# Patient Record
Sex: FEMALE | Race: WHITE | Employment: UNEMPLOYED | URBAN - METROPOLITAN AREA
[De-identification: names, ages, dates, MRNs, and addresses within clinical notes are randomized per-mention and may not be internally consistent; named-entity substitution may affect disease eponyms.]

---

## 2021-03-19 ENCOUNTER — OFFICE VISIT (OUTPATIENT)
Dept: OBGYN CLINIC | Age: 56
End: 2021-03-19
Payer: COMMERCIAL

## 2021-03-19 VITALS — DIASTOLIC BLOOD PRESSURE: 78 MMHG | HEART RATE: 99 BPM | SYSTOLIC BLOOD PRESSURE: 132 MMHG | WEIGHT: 152 LBS

## 2021-03-19 DIAGNOSIS — R10.2 PELVIC PAIN IN FEMALE: ICD-10-CM

## 2021-03-19 DIAGNOSIS — B37.31 VULVOVAGINITIS DUE TO YEAST: ICD-10-CM

## 2021-03-19 DIAGNOSIS — B37.31 VULVOVAGINITIS DUE TO YEAST: Primary | ICD-10-CM

## 2021-03-19 PROCEDURE — 99204 OFFICE O/P NEW MOD 45 MIN: CPT | Performed by: OBSTETRICS & GYNECOLOGY

## 2021-03-19 RX ORDER — TRAZODONE HYDROCHLORIDE 50 MG/1
TABLET ORAL
COMMUNITY
Start: 2021-02-08 | End: 2021-05-13 | Stop reason: SDUPTHER

## 2021-03-19 RX ORDER — FLUCONAZOLE 150 MG/1
150 TABLET ORAL DAILY
Qty: 30 TABLET | Refills: 0 | Status: SHIPPED | OUTPATIENT
Start: 2021-03-19 | End: 2022-03-23 | Stop reason: SDUPTHER

## 2021-03-19 RX ORDER — CLOTRIMAZOLE AND BETAMETHASONE DIPROPIONATE 10; .64 MG/G; MG/G
CREAM TOPICAL
Qty: 1 TUBE | Refills: 6 | Status: SHIPPED | OUTPATIENT
Start: 2021-03-19

## 2021-03-19 RX ORDER — CITALOPRAM 20 MG/1
TABLET ORAL
COMMUNITY
Start: 2021-03-14 | End: 2021-07-19 | Stop reason: SDUPTHER

## 2021-03-19 RX ORDER — ESTRADIOL 0.1 MG/G
CREAM VAGINAL
COMMUNITY
Start: 2021-02-17

## 2021-03-19 RX ORDER — EMPAGLIFLOZIN 25 MG/1
25 TABLET, FILM COATED ORAL DAILY
COMMUNITY
Start: 2020-05-25 | End: 2021-07-19 | Stop reason: ALTCHOICE

## 2021-03-19 ASSESSMENT — ENCOUNTER SYMPTOMS
ABDOMINAL DISTENTION: 0
VOMITING: 0
NAUSEA: 0
COLOR CHANGE: 0
SORE THROAT: 0
BACK PAIN: 0
CONSTIPATION: 0
WHEEZING: 0
COUGH: 0
SHORTNESS OF BREATH: 0
ABDOMINAL PAIN: 0
BLOOD IN STOOL: 0
CHEST TIGHTNESS: 0
VOICE CHANGE: 0
TROUBLE SWALLOWING: 0

## 2021-03-19 NOTE — PROGRESS NOTES
arthralgias, back pain, joint swelling and myalgias. Skin: Negative for color change and rash. Allergic/Immunologic: Negative for environmental allergies, food allergies and immunocompromised state. Neurological: Negative for dizziness, seizures, syncope, speech difficulty, weakness, numbness and headaches. Hematological: Negative for adenopathy. Does not bruise/bleed easily. Psychiatric/Behavioral: Negative for agitation, behavioral problems, confusion, decreased concentration, dysphoric mood and suicidal ideas. The patient is not nervous/anxious and is not hyperactive. Physical Exam  Constitutional:       Appearance: She is well-developed. HENT:      Head: Normocephalic and atraumatic. Eyes:      Pupils: Pupils are equal, round, and reactive to light. Neck:      Musculoskeletal: Normal range of motion. Thyroid: No thyromegaly. Trachea: No tracheal deviation. Cardiovascular:      Rate and Rhythm: Normal rate and regular rhythm. Heart sounds: Normal heart sounds. Pulmonary:      Effort: Pulmonary effort is normal.      Breath sounds: Normal breath sounds. Chest:      Chest wall: No tenderness. Abdominal:      General: Bowel sounds are normal. There is no distension. Palpations: Abdomen is soft. There is no mass. Tenderness: There is no abdominal tenderness. There is no guarding or rebound. Hernia: There is no hernia in the left inguinal area. Genitourinary:     Labia:         Right: No rash, tenderness, lesion or injury. Left: No rash, tenderness, lesion or injury. Vagina: Normal. No foreign body. No vaginal discharge, erythema, tenderness or bleeding. Adnexa:         Right: No mass, tenderness or fullness. Left: No mass, tenderness or fullness. Rectum: Normal. No mass, tenderness, anal fissure, external hemorrhoid or internal hemorrhoid. Normal anal tone. Musculoskeletal: Normal range of motion. Lymphadenopathy:      Cervical: No cervical adenopathy. Neurological:      Mental Status: She is alert and oriented to person, place, and time. Vitals:    03/19/21 0913   BP: 132/78   Pulse: 99   Weight: 152 lb (68.9 kg)       ASSESSMENT/PLAN:  Yeast vulvovaginitis  Pelvic pain  Plan patient to follow-up here in 2 weeks after she has been on low Lotrisone twice a day we did a yeast culture to determine the exact type of yeast that she has. And will discuss further possible treatments for the pelvic pain      No follow-ups on file. On this date 3/19/2021 I have spent 45 minutes reviewing previous notes, test results and face to face with the patient discussing the diagnosis and importance of compliance with the treatment plan as well as documenting on the day of the visit. An electronic signature was used to authenticate this note.

## 2021-03-22 DIAGNOSIS — R10.2 PELVIC PAIN IN FEMALE: ICD-10-CM

## 2021-03-22 LAB
BILIRUBIN URINE: NEGATIVE
BLOOD, URINE: NEGATIVE
CLARITY: CLEAR
COLOR: YELLOW
GLUCOSE URINE: >=1000 MG/DL
KETONES, URINE: ABNORMAL MG/DL
LEUKOCYTE ESTERASE, URINE: NEGATIVE
NITRITE, URINE: NEGATIVE
PH UA: 6 (ref 5–9)
PROTEIN UA: NEGATIVE MG/DL
SPECIFIC GRAVITY UA: 1.04 (ref 1–1.03)
UROBILINOGEN, URINE: 1 E.U./DL

## 2021-03-24 LAB
ORGANISM: ABNORMAL
URINE CULTURE, ROUTINE: ABNORMAL

## 2021-04-07 PROBLEM — E66.9 DIABETES MELLITUS TYPE 2 IN OBESE (HCC): Status: ACTIVE | Noted: 2018-08-01

## 2021-04-07 PROBLEM — E11.69 DIABETES MELLITUS TYPE 2 IN OBESE (HCC): Status: ACTIVE | Noted: 2018-08-01

## 2021-04-07 PROBLEM — N39.41 URGE INCONTINENCE: Status: ACTIVE | Noted: 2021-04-07

## 2021-04-07 PROBLEM — G89.18 PAIN AT SURGICAL SITE: Status: ACTIVE | Noted: 2020-06-27

## 2021-04-07 PROBLEM — Z79.890 POST-MENOPAUSE ON HRT (HORMONE REPLACEMENT THERAPY): Status: ACTIVE | Noted: 2021-04-07

## 2021-04-07 PROBLEM — Z83.3 FAMILY HISTORY OF DIABETES MELLITUS (DM): Status: ACTIVE | Noted: 2021-04-07

## 2021-04-07 PROBLEM — T85.9XXA COMPLICATION OF IMPLANTED VAGINAL MESH: Status: ACTIVE | Noted: 2020-06-27

## 2021-04-07 PROBLEM — E83.52 HYPERCALCEMIA: Status: ACTIVE | Noted: 2018-08-01

## 2021-04-12 RX ORDER — GABAPENTIN 100 MG/1
CAPSULE ORAL
COMMUNITY
Start: 2021-04-02 | End: 2021-05-13 | Stop reason: SDUPTHER

## 2021-04-13 ENCOUNTER — OFFICE VISIT (OUTPATIENT)
Dept: OBGYN CLINIC | Age: 56
End: 2021-04-13
Payer: COMMERCIAL

## 2021-04-13 VITALS — HEART RATE: 102 BPM | SYSTOLIC BLOOD PRESSURE: 122 MMHG | WEIGHT: 152 LBS | DIASTOLIC BLOOD PRESSURE: 62 MMHG

## 2021-04-13 DIAGNOSIS — B37.31 VULVOVAGINITIS DUE TO YEAST: Primary | ICD-10-CM

## 2021-04-13 DIAGNOSIS — R10.2 PELVIC PAIN IN FEMALE: ICD-10-CM

## 2021-04-13 PROCEDURE — 99213 OFFICE O/P EST LOW 20 MIN: CPT | Performed by: OBSTETRICS & GYNECOLOGY

## 2021-04-13 RX ORDER — FLUCONAZOLE 150 MG/1
150 TABLET ORAL ONCE
Qty: 4 TABLET | Refills: 3 | Status: SHIPPED | OUTPATIENT
Start: 2021-04-13 | End: 2021-10-21 | Stop reason: SDUPTHER

## 2021-04-13 RX ORDER — CONJUGATED ESTROGENS 0.62 MG/G
0.5 CREAM VAGINAL DAILY
Qty: 1 TUBE | Refills: 3 | Status: SHIPPED | OUTPATIENT
Start: 2021-04-13 | End: 2022-05-13

## 2021-04-13 ASSESSMENT — ENCOUNTER SYMPTOMS
NAUSEA: 0
VOICE CHANGE: 0
CONSTIPATION: 0
CHEST TIGHTNESS: 0
BACK PAIN: 0
ABDOMINAL PAIN: 0
SHORTNESS OF BREATH: 0
TROUBLE SWALLOWING: 0
COLOR CHANGE: 0
BLOOD IN STOOL: 0
VOMITING: 0
COUGH: 0
WHEEZING: 0
SORE THROAT: 0
ABDOMINAL DISTENTION: 0

## 2021-04-13 NOTE — PROGRESS NOTES
patient is not nervous/anxious and is not hyperactive. Physical Exam  Vitals signs reviewed. Exam conducted with a chaperone present. Genitourinary:                  Vitals:    04/13/21 1308   BP: 122/62   Pulse: 102   Weight: 152 lb (68.9 kg)       ASSESSMENT/PLAN:   Diagnosis Orders   1. Vulvovaginitis due to yeast     2. Pelvic pain in female     Premarin vaginal cream nightly for a month  Premarin 0.625 daily  Continue the Diflucan 50 mg weekly. Patient to follow-up in 1 month      No follow-ups on file. On this date 4/13/2021 I have spent 30 minutes reviewing previous notes, test results and face to face with the patient discussing the diagnosis and importance of compliance with the treatment plan as well as documenting on the day of the visit. An electronic signature was used to authenticate this note.

## 2021-04-16 ENCOUNTER — PATIENT MESSAGE (OUTPATIENT)
Dept: OBGYN CLINIC | Age: 56
End: 2021-04-16

## 2021-04-19 LAB
FINAL REPORT: NORMAL
PRELIMINARY: NORMAL

## 2021-04-21 RX ORDER — ESTRADIOL 1 MG/1
1 TABLET ORAL DAILY
Qty: 30 TABLET | Refills: 3 | Status: SHIPPED | OUTPATIENT
Start: 2021-04-21 | End: 2021-06-21 | Stop reason: ALTCHOICE

## 2021-04-21 NOTE — TELEPHONE ENCOUNTER
Good Morning Aretha, I spoke with Dr. Víctor Moreno regarding the oral Premarin not being covered by your insurance and he suggested switching to a medication that is covered. Estradiol was sent in it's place. There are 3 refills. I looked over your chart and your changes to your emergency contact have been updated.

## 2021-05-13 ENCOUNTER — OFFICE VISIT (OUTPATIENT)
Dept: OBGYN CLINIC | Age: 56
End: 2021-05-13
Payer: COMMERCIAL

## 2021-05-13 VITALS — DIASTOLIC BLOOD PRESSURE: 96 MMHG | WEIGHT: 152 LBS | HEART RATE: 91 BPM | SYSTOLIC BLOOD PRESSURE: 134 MMHG

## 2021-05-13 DIAGNOSIS — Z12.31 ENCOUNTER FOR SCREENING MAMMOGRAM FOR BREAST CANCER: Primary | ICD-10-CM

## 2021-05-13 PROCEDURE — 99213 OFFICE O/P EST LOW 20 MIN: CPT | Performed by: OBSTETRICS & GYNECOLOGY

## 2021-05-13 RX ORDER — GABAPENTIN 100 MG/1
CAPSULE ORAL
Qty: 90 CAPSULE | Refills: 0 | Status: SHIPPED | OUTPATIENT
Start: 2021-05-13 | End: 2021-06-21 | Stop reason: ALTCHOICE

## 2021-05-13 RX ORDER — TRAZODONE HYDROCHLORIDE 50 MG/1
TABLET ORAL
Qty: 60 TABLET | Refills: 0 | Status: SHIPPED | OUTPATIENT
Start: 2021-05-13 | End: 2021-06-25 | Stop reason: SDUPTHER

## 2021-05-13 ASSESSMENT — ENCOUNTER SYMPTOMS
SORE THROAT: 0
VOICE CHANGE: 0
COLOR CHANGE: 0
NAUSEA: 0
ABDOMINAL PAIN: 0
SHORTNESS OF BREATH: 0
WHEEZING: 0
CHEST TIGHTNESS: 0
BACK PAIN: 0
ABDOMINAL DISTENTION: 0
COUGH: 0
TROUBLE SWALLOWING: 0
CONSTIPATION: 0
BLOOD IN STOOL: 0
VOMITING: 0

## 2021-05-13 NOTE — PROGRESS NOTES
Nancy Turpin (: 1965) is a 50 y.o. female, Established patient, here for evaluation of the following chief complaint(s):  Follow-up  Ryne Bolanos is here for a follow-up visit. She is presently using half a gram of Premarin vaginal cream nightly and has done so for a month. She is also taking taking Premarin orally is been using Diflucan once a week for a chronic yeast infection. Those symptoms are gone. The hot flashes are markedly reduced as well. Unfortunately patient is under a great deal of stress that she is going through a divorce. SUBJECTIVE/OBJECTIVE:  HPI    Past Surgical History:   Procedure Laterality Date    BLADDER SUSPENSION      HYSTERECTOMY      LEEP          Review of Systems   Constitutional: Negative for activity change, appetite change, fatigue and unexpected weight change. HENT: Negative for dental problem, ear pain, hearing loss, nosebleeds, sore throat, trouble swallowing and voice change. Eyes: Negative for visual disturbance. Respiratory: Negative for cough, chest tightness, shortness of breath and wheezing. Cardiovascular: Negative for chest pain and palpitations. Gastrointestinal: Negative for abdominal distention, abdominal pain, blood in stool, constipation, nausea and vomiting. Endocrine: Negative for cold intolerance, heat intolerance, polydipsia, polyphagia and polyuria. Genitourinary: Negative for difficulty urinating, dyspareunia, dysuria, flank pain, frequency, genital sores, hematuria, menstrual problem, pelvic pain, urgency, vaginal bleeding, vaginal discharge and vaginal pain. Musculoskeletal: Negative for arthralgias, back pain, joint swelling and myalgias. Skin: Negative for color change and rash. Allergic/Immunologic: Negative for environmental allergies, food allergies and immunocompromised state. Neurological: Negative for dizziness, seizures, syncope, speech difficulty, weakness, numbness and headaches.    Hematological: Negative for adenopathy. Does not bruise/bleed easily. Psychiatric/Behavioral: Negative for agitation, behavioral problems, confusion, decreased concentration, dysphoric mood and suicidal ideas. The patient is not nervous/anxious and is not hyperactive. Physical Exam  Constitutional:       Appearance: She is well-developed. HENT:      Head: Normocephalic and atraumatic. Eyes:      Pupils: Pupils are equal, round, and reactive to light. Neck:      Musculoskeletal: Normal range of motion. Thyroid: No thyromegaly. Trachea: No tracheal deviation. Cardiovascular:      Rate and Rhythm: Normal rate and regular rhythm. Heart sounds: Normal heart sounds. Pulmonary:      Effort: Pulmonary effort is normal.      Breath sounds: Normal breath sounds. Chest:      Chest wall: No tenderness. Abdominal:      General: Bowel sounds are normal. There is no distension. Palpations: Abdomen is soft. There is no mass. Tenderness: There is no abdominal tenderness. There is no guarding or rebound. Hernia: There is no hernia in the left inguinal area. Genitourinary:     Labia:         Right: No rash, tenderness, lesion or injury. Left: No rash, tenderness, lesion or injury. Vagina: Normal. No foreign body. No vaginal discharge, erythema, tenderness or bleeding. Adnexa:         Right: No mass, tenderness or fullness. Left: No mass, tenderness or fullness. Rectum: Normal. No mass, tenderness, anal fissure, external hemorrhoid or internal hemorrhoid. Normal anal tone. Musculoskeletal: Normal range of motion. Lymphadenopathy:      Cervical: No cervical adenopathy. Neurological:      Mental Status: She is alert and oriented to person, place, and time. Vitals:    05/13/21 0933 05/13/21 0939   BP: (!) 148/96 (!) 134/96   Pulse: 91    Weight: 152 lb (68.9 kg)        ASSESSMENT/PLAN:   Diagnosis Orders   1.  Encounter for screening mammogram for breast cancer  Ambulatory referral to [facility]    MARCELINO DIGITAL SCREEN W OR WO CAD BILATERAL   Patient to continue using the oral estrogen. She is going to use the Premarin cream twice a week now for the next 3 months and try to stop it at that point. She is also going to stop taking the Diflucan. She knows that she can restart both if there are recurrent issues. She will follow up here in 1 year  Mammogram ordered    No follow-ups on file. On this date 5/13/2021 I have spent 30 minutes reviewing previous notes, test results and face to face with the patient discussing the diagnosis and importance of compliance with the treatment plan as well as documenting on the day of the visit. An electronic signature was used to authenticate this note.

## 2021-06-21 ENCOUNTER — OFFICE VISIT (OUTPATIENT)
Dept: FAMILY MEDICINE CLINIC | Age: 56
End: 2021-06-21
Payer: COMMERCIAL

## 2021-06-21 VITALS
WEIGHT: 157.2 LBS | HEIGHT: 66 IN | SYSTOLIC BLOOD PRESSURE: 122 MMHG | DIASTOLIC BLOOD PRESSURE: 87 MMHG | BODY MASS INDEX: 25.26 KG/M2 | HEART RATE: 91 BPM | OXYGEN SATURATION: 99 % | TEMPERATURE: 97.5 F

## 2021-06-21 DIAGNOSIS — E66.9 DIABETES MELLITUS TYPE 2 IN OBESE (HCC): ICD-10-CM

## 2021-06-21 DIAGNOSIS — M62.830 BACK SPASM: ICD-10-CM

## 2021-06-21 DIAGNOSIS — Z00.00 PREVENTATIVE HEALTH CARE: ICD-10-CM

## 2021-06-21 DIAGNOSIS — E66.9 DIABETES MELLITUS TYPE 2 IN OBESE (HCC): Primary | ICD-10-CM

## 2021-06-21 DIAGNOSIS — R10.2 PELVIC PAIN: ICD-10-CM

## 2021-06-21 DIAGNOSIS — M54.50 ACUTE BILATERAL LOW BACK PAIN, UNSPECIFIED WHETHER SCIATICA PRESENT: ICD-10-CM

## 2021-06-21 DIAGNOSIS — E11.69 DIABETES MELLITUS TYPE 2 IN OBESE (HCC): ICD-10-CM

## 2021-06-21 DIAGNOSIS — Z12.11 COLON CANCER SCREENING: ICD-10-CM

## 2021-06-21 DIAGNOSIS — E11.69 DIABETES MELLITUS TYPE 2 IN OBESE (HCC): Primary | ICD-10-CM

## 2021-06-21 DIAGNOSIS — M25.559 HIP PAIN: ICD-10-CM

## 2021-06-21 LAB
CREATININE URINE: 71.7 MG/DL
HBA1C MFR BLD: 10.2 %
MICROALBUMIN UR-MCNC: <1.2 MG/DL
MICROALBUMIN/CREAT UR-RTO: NORMAL MG/G (ref 0–30)

## 2021-06-21 PROCEDURE — 99214 OFFICE O/P EST MOD 30 MIN: CPT | Performed by: NURSE PRACTITIONER

## 2021-06-21 PROCEDURE — 83036 HEMOGLOBIN GLYCOSYLATED A1C: CPT | Performed by: NURSE PRACTITIONER

## 2021-06-21 RX ORDER — PREGABALIN 75 MG/1
75 CAPSULE ORAL 3 TIMES DAILY
Qty: 90 CAPSULE | Refills: 0 | Status: SHIPPED | OUTPATIENT
Start: 2021-06-21 | End: 2021-07-01 | Stop reason: SINTOL

## 2021-06-21 RX ORDER — BACLOFEN 10 MG/1
10 TABLET ORAL 2 TIMES DAILY PRN
Qty: 30 TABLET | Refills: 0 | Status: SHIPPED | OUTPATIENT
Start: 2021-06-21 | End: 2021-07-01 | Stop reason: SINTOL

## 2021-06-21 SDOH — ECONOMIC STABILITY: FOOD INSECURITY: WITHIN THE PAST 12 MONTHS, THE FOOD YOU BOUGHT JUST DIDN'T LAST AND YOU DIDN'T HAVE MONEY TO GET MORE.: NEVER TRUE

## 2021-06-21 SDOH — ECONOMIC STABILITY: FOOD INSECURITY: WITHIN THE PAST 12 MONTHS, YOU WORRIED THAT YOUR FOOD WOULD RUN OUT BEFORE YOU GOT MONEY TO BUY MORE.: NEVER TRUE

## 2021-06-21 ASSESSMENT — PATIENT HEALTH QUESTIONNAIRE - PHQ9
1. LITTLE INTEREST OR PLEASURE IN DOING THINGS: 1
SUM OF ALL RESPONSES TO PHQ9 QUESTIONS 1 & 2: 1
SUM OF ALL RESPONSES TO PHQ QUESTIONS 1-9: 1
2. FEELING DOWN, DEPRESSED OR HOPELESS: 0

## 2021-06-21 ASSESSMENT — SOCIAL DETERMINANTS OF HEALTH (SDOH): HOW HARD IS IT FOR YOU TO PAY FOR THE VERY BASICS LIKE FOOD, HOUSING, MEDICAL CARE, AND HEATING?: NOT HARD AT ALL

## 2021-06-21 NOTE — PROGRESS NOTES
Subjective:      Patient ID: Barbi Kiran is a 49 y.o. female who presents today for:     Chief Complaint   Patient presents with   [address]     Patient presents today to establish care. HPI Pt in today to establish care. She reports hx of bladder lift years ago which has given her significant pain since then. She reports that the pain would be in lower abdomen, legs, hips, etc. She reports chronic UTI and yeast infections. She reports that she then started to have a prolapse of the mesh. She reports then she was getting ulcers in vagina and bleeding. She reports she saw a doctor and had all the mesh removed. This did help the pain in her legs. She reports that since going back to work her bilateral hips and right groin have been hurting due to all the walking. She reports that she was started on gabapentin which helps with the pain at home, but unfortunately she cannot take it at work because it makes her too drowsy. She reports that she hasn't been taking metformin or jardiance. She does have it at home. She reports she was trying to limit medications because she's already on so much, but she can start taking it again. She reports that it makes her feel sick when she takes it all so that was part of why she stopped them.      Past Medical History:   Diagnosis Date    Breast disorder     LEFT LUMP 1990 BENIGN    Depression     Diabetes mellitus ([facility] [address]     Menopausal symptoms     Overactive bladder     Stress incontinence      Past Surgical History:   Procedure Laterality Date    BLADDER SUSPENSION      HYSTERECTOMY      LEEP       Family History   Problem Relation Age of Onset    Breast Cancer Mother      Social History     Socioeconomic History    Marital status:      Spouse name: Not on file    Number of children: Not on file    Years of education: Not on file    Highest education level: Not on file   Occupational History    Not on file   Tobacco Use    Smoking status: medications on file prior to visit. Allergies:  Nitrates, organic    Review of Systems   Constitutional: Positive for fatigue. Negative for chills and fever. HENT: Negative for congestion, ear pain and sinus pressure. Respiratory: Negative for cough, shortness of breath and wheezing. Cardiovascular: Negative for chest pain and palpitations. Endocrine: Negative for polyuria. Musculoskeletal: Positive for arthralgias, gait problem and myalgias. Neurological: Negative for headaches. Objective:   /87 (Site: Right Upper Arm, Position: Sitting, Cuff Size: Medium Adult)   Pulse 91   Temp 97.5 °F (36.4 °C) (Temporal)   Ht 5' 6\" (1.676 m)   Wt 157 lb 3.2 oz (71.3 kg)   SpO2 99%   BMI 25.37 kg/m²     Physical Exam  Constitutional:       Appearance: She is well-developed. HENT:      Head: Normocephalic. Right Ear: Tympanic membrane, ear canal and external ear normal.      Left Ear: Tympanic membrane, ear canal and external ear normal.      Nose: Nose normal.      Mouth/Throat:      Mouth: Mucous membranes are moist.      Pharynx: Oropharynx is clear. Uvula midline. Eyes:      General:         Right eye: No discharge. Left eye: No discharge. Conjunctiva/sclera: Conjunctivae normal.   Cardiovascular:      Rate and Rhythm: Normal rate and regular rhythm. Heart sounds: Normal heart sounds. Pulmonary:      Effort: Pulmonary effort is normal. No respiratory distress. Breath sounds: Normal breath sounds. Musculoskeletal:      Cervical back: Normal range of motion. Lymphadenopathy:      Cervical: No cervical adenopathy. Skin:     General: Skin is warm and dry. Neurological:      Mental Status: She is alert and oriented to person, place, and time. Psychiatric:         Mood and Affect: Mood normal.         Behavior: Behavior normal.         Assessment:          Diagnosis Orders   1.  Diabetes mellitus type 2 in obese (HCC)  Microalbumin / Creatinine Urine Ratio    POCT glycosylated hemoglobin (Hb A1C)    Comprehensive Metabolic Panel    Lipid Panel   2. Colon cancer screening  Truddie Mcardle, MD, GastroenterologyJanett   3. Preventative health care  CBC Auto Differential    Comprehensive Metabolic Panel    Lipid Panel   4. Hip pain  pregabalin (LYRICA) 75 MG capsule    XR HIP 3-4 VW W PELVIS BILATERAL   5. Pelvic pain  XR HIP 3-4 VW W PELVIS BILATERAL   6. Acute bilateral low back pain, unspecified whether sciatica present  pregabalin (LYRICA) 75 MG capsule    baclofen (LIORESAL) 10 MG tablet   7.  Back spasm  baclofen (LIORESAL) 10 MG tablet       Plan:      Orders Placed This Encounter   Procedures    XR HIP 3-4 VW W PELVIS BILATERAL     Standing Status:   Future     Standing Expiration Date:   6/21/2022     Order Specific Question:   Reason for exam:     Answer:   bilateral hip pain, right groin pain, and tailbone pain    Microalbumin / Creatinine Urine Ratio     Standing Status:   Future     Number of Occurrences:   1     Standing Expiration Date:   6/21/2022    CBC Auto Differential     Standing Status:   Future     Standing Expiration Date:   6/21/2022    Comprehensive Metabolic Panel     Standing Status:   Future     Standing Expiration Date:   6/21/2022    Lipid Panel     Standing Status:   Future     Standing Expiration Date:   6/21/2022     Order Specific Question:   Is Patient Fasting?/# of Hours     Answer:   [address] [place] [address] Carlos Leung MD, Gastroenterology, [place]     Referral Priority:   Routine     Referral Type:   Eval and Treat     Referral Reason:   Specialty Services Required     Referred to Provider:   Erma Marrero MD     Requested Specialty:   Gastroenterology     Number of Visits Requested:   1    POCT glycosylated hemoglobin (Hb A1C)     Results for POC orders placed in visit on 06/21/21   POCT glycosylated hemoglobin (Hb A1C)   Result Value Ref Range    Hemoglobin A1C 10.2 %          Orders Placed This Encounter   Medications    pregabalin (LYRICA) 75 MG capsule     Sig: Take 1 capsule by mouth 3 times daily for 30 days. Dispense:  90 capsule     Refill:  0    baclofen (LIORESAL) 10 MG tablet     Sig: Take 1 tablet by mouth 2 times daily as needed (muscle spasm)     Dispense:  30 tablet     Refill:  0       Return in about 1 month (around 7/21/2021). Stop gabapentin. Start lyrica. Okay for baclofen prn. Start back on Dm2 medication. XR and labs. Keep f/u with GYN as scheduled. Reviewed with the patient: current clinicalstatus, medications, activities and diet. Side effects, adverse effects of the medication prescribedtoday, as well as treatment plan/ rationale and result expectations have been discussedwith the patient who expresses understanding and desires to proceed. Close follow upto evaluate treatment results and for coordination of care. I have reviewedthe patient's medical history in detail and updated the computerized patient record.     Narendra Gupta, FRANTZ - CNP

## 2021-06-21 NOTE — PATIENT INSTRUCTIONS
more stretch, put your other leg flat on the floor while pulling your knee to your chest.    Curl-ups   1. Lie on the floor on your back with your knees bent at a 90-degree angle. Your feet should be flat on the floor, about 12 inches from your buttocks. 2. Cross your arms over your chest. If this bothers your neck, try putting your hands behind your neck (not your head), with your elbows spread apart. 3. Slowly tighten your belly muscles and raise your shoulder blades off the floor. 4. Keep your head in line with your body, and do not press your chin to your chest.  5. Hold this position for 1 or 2 seconds, then slowly lower yourself back down to the floor. 6. Repeat 8 to 12 times. Pelvic tilt exercise   1. Lie on your back with your knees bent. 2. \"Brace\" your stomach. This means to tighten your muscles by pulling in and imagining your belly button moving toward your spine. You should feel like your back is pressing to the floor and your hips and pelvis are rocking back. 3. Hold for about 6 seconds while you breathe smoothly. 4. Repeat 8 to 12 times. Heel dig bridging   1. Lie on your back with both knees bent and your ankles bent so that only your heels are digging into the floor. Your knees should be bent about 90 degrees. 2. Then push your heels into the floor, squeeze your buttocks, and lift your hips off the floor until your shoulders, hips, and knees are all in a straight line. 3. Hold for about 6 seconds as you continue to breathe normally, and then slowly lower your hips back down to the floor and rest for up to 10 seconds. 4. Do 8 to 12 repetitions. Hamstring stretch in doorway   1. Lie on your back in a doorway, with one leg through the open door. 2. Slide your leg up the wall to straighten your knee. You should feel a gentle stretch down the back of your leg. 3. Hold the stretch for at least 15 to 30 seconds. Do not arch your back, point your toes, or bend either knee.  Keep one heel touching the floor and the other heel touching the wall. 4. Repeat with your other leg. 5. Do 2 to 4 times for each leg. Hip flexor stretch   1. Kneel on the floor with one knee bent and one leg behind you. Place your forward knee over your foot. Keep your other knee touching the floor. 2. Slowly push your hips forward until you feel a stretch in the upper thigh of your rear leg. 3. Hold the stretch for at least 15 to 30 seconds. Repeat with your other leg. 4. Do 2 to 4 times on each side. Wall sit   1. Stand with your back 10 to 12 inches away from a wall. 2. Lean into the wall until your back is flat against it. 3. Slowly slide down until your knees are slightly bent, pressing your lower back into the wall. 4. Hold for about 6 seconds, then slide back up the wall. 5. Repeat 8 to 12 times. Follow-up care is a key part of your treatment and safety. Be sure to make and go to all appointments, and call your doctor if you are having problems. It's also a good idea to know your test results and keep a list of the medicines you take. Where can you learn more? Go to https://[email or web address]. org and sign in to your [employer] account. Enter H651 in the [facility] box to learn more about \"Low Back Pain: Exercises. \"     If you do not have an account, please click on the \"Sign Up Now\" link. Current as of: November 16, 2020               Content Version: 12.9  © 2006-2021 [employer]wise, Inc[employer]. Care instructions adapted under license by [facility] ([facility]). If you have questions about a medical condition or this instruction, always ask your healthcare professional. [place] [address] any warranty or liability for your use of this information.

## 2021-06-25 ASSESSMENT — ENCOUNTER SYMPTOMS
SHORTNESS OF BREATH: 0
WHEEZING: 0
COUGH: 0
SINUS PRESSURE: 0

## 2021-07-01 ENCOUNTER — PATIENT MESSAGE (OUTPATIENT)
Dept: FAMILY MEDICINE CLINIC | Age: 56
End: 2021-07-01

## 2021-07-01 RX ORDER — ESTRADIOL 1 MG/1
1 TABLET ORAL DAILY
Qty: 30 TABLET | Refills: 3
Start: 2021-07-01 | End: 2021-09-16

## 2021-07-01 NOTE — TELEPHONE ENCOUNTER
From: David Arriaga  To: Raymond Anoop, APRN - CNP  Sent: 7/1/2021 11:06 AM EDT  Subject: Prescription Question    [facility], I do not like either of these new meds. . Baclofen or Pregabalin. Both make me very dizzy and light-headed. I can't take these meds during the day for nerve pain when I am at work. I am stopping them today. Unless you have a better option, I will go back to taking the Gabapentin. I usually take (2) 100 mg. Also as a correction, I have not stopped taking estradiol or estrogen.      START taking:  baclofen (LIORESAL)   pregabalin (Lyrica)      STOP taking:  estradiol 1 MG tablet (Estrace)   estrogens (conjugated) 0.625 MG tablet (Premarin)   gabapentin 100 MG capsule (NEURONTIN)

## 2021-07-03 DIAGNOSIS — E66.9 DIABETES MELLITUS TYPE 2 IN OBESE (HCC): ICD-10-CM

## 2021-07-03 DIAGNOSIS — E11.69 DIABETES MELLITUS TYPE 2 IN OBESE (HCC): ICD-10-CM

## 2021-07-03 DIAGNOSIS — Z00.00 PREVENTATIVE HEALTH CARE: ICD-10-CM

## 2021-07-03 LAB
ALBUMIN SERPL-MCNC: 4.7 G/DL (ref 3.5–4.6)
ALP BLD-CCNC: 106 U/L (ref 40–130)
ALT SERPL-CCNC: 31 U/L (ref 0–33)
ANION GAP SERPL CALCULATED.3IONS-SCNC: 13 MEQ/L (ref 9–15)
AST SERPL-CCNC: 31 U/L (ref 0–35)
BASOPHILS ABSOLUTE: 0 K/UL (ref 0–0.2)
BASOPHILS RELATIVE PERCENT: 0.3 %
BILIRUB SERPL-MCNC: 0.8 MG/DL (ref 0.2–0.7)
BUN BLDV-MCNC: 12 MG/DL (ref 6–20)
CALCIUM SERPL-MCNC: 9.6 MG/DL (ref 8.5–9.9)
CHLORIDE BLD-SCNC: 97 MEQ/L (ref 95–107)
CHOLESTEROL, TOTAL: 193 MG/DL (ref 0–199)
CO2: 27 MEQ/L (ref 20–31)
CREAT SERPL-MCNC: 0.65 MG/DL (ref 0.5–0.9)
EOSINOPHILS ABSOLUTE: 0.1 K/UL (ref 0–0.7)
EOSINOPHILS RELATIVE PERCENT: 1.3 %
GFR AFRICAN AMERICAN: >60
GFR NON-AFRICAN AMERICAN: >60
GLOBULIN: 3.3 G/DL (ref 2.3–3.5)
GLUCOSE BLD-MCNC: 127 MG/DL (ref 70–99)
HCT VFR BLD CALC: 48 % (ref 37–47)
HDLC SERPL-MCNC: 39 MG/DL (ref 40–59)
HEMOGLOBIN: 16.5 G/DL (ref 12–16)
LDL CHOLESTEROL CALCULATED: 89 MG/DL (ref 0–129)
LYMPHOCYTES ABSOLUTE: 2.9 K/UL (ref 1–4.8)
LYMPHOCYTES RELATIVE PERCENT: 39.9 %
MCH RBC QN AUTO: 30.3 PG (ref 27–31.3)
MCHC RBC AUTO-ENTMCNC: 34.5 % (ref 33–37)
MCV RBC AUTO: 88 FL (ref 82–100)
MONOCYTES ABSOLUTE: 0.5 K/UL (ref 0.2–0.8)
MONOCYTES RELATIVE PERCENT: 6.8 %
NEUTROPHILS ABSOLUTE: 3.8 K/UL (ref 1.4–6.5)
NEUTROPHILS RELATIVE PERCENT: 51.7 %
PDW BLD-RTO: 12.7 % (ref 11.5–14.5)
PLATELET # BLD: 304 K/UL (ref 130–400)
POTASSIUM SERPL-SCNC: 3.8 MEQ/L (ref 3.4–4.9)
RBC # BLD: 5.45 M/UL (ref 4.2–5.4)
SODIUM BLD-SCNC: 137 MEQ/L (ref 135–144)
TOTAL PROTEIN: 8 G/DL (ref 6.3–8)
TRIGL SERPL-MCNC: 325 MG/DL (ref 0–150)
WBC # BLD: 7.3 K/UL (ref 4.8–10.8)

## 2021-07-19 ENCOUNTER — OFFICE VISIT (OUTPATIENT)
Dept: FAMILY MEDICINE CLINIC | Age: 56
End: 2021-07-19
Payer: COMMERCIAL

## 2021-07-19 VITALS
OXYGEN SATURATION: 96 % | HEIGHT: 66 IN | TEMPERATURE: 97.3 F | SYSTOLIC BLOOD PRESSURE: 130 MMHG | DIASTOLIC BLOOD PRESSURE: 82 MMHG | HEART RATE: 83 BPM | WEIGHT: 155.4 LBS | BODY MASS INDEX: 24.98 KG/M2

## 2021-07-19 DIAGNOSIS — K42.9 PERIUMBILICAL HERNIA: ICD-10-CM

## 2021-07-19 DIAGNOSIS — G62.9 NEUROPATHY: ICD-10-CM

## 2021-07-19 DIAGNOSIS — M53.3 COCCYX PAIN: ICD-10-CM

## 2021-07-19 DIAGNOSIS — E11.9 TYPE 2 DIABETES MELLITUS WITHOUT COMPLICATION, WITHOUT LONG-TERM CURRENT USE OF INSULIN (HCC): Primary | ICD-10-CM

## 2021-07-19 DIAGNOSIS — M25.559 HIP PAIN: ICD-10-CM

## 2021-07-19 PROCEDURE — 99214 OFFICE O/P EST MOD 30 MIN: CPT | Performed by: NURSE PRACTITIONER

## 2021-07-19 RX ORDER — GABAPENTIN 100 MG/1
100 CAPSULE ORAL 3 TIMES DAILY
COMMUNITY
End: 2021-07-19 | Stop reason: DRUGHIGH

## 2021-07-19 RX ORDER — GABAPENTIN 300 MG/1
300 CAPSULE ORAL 3 TIMES DAILY
Qty: 90 CAPSULE | Refills: 2 | Status: SHIPPED | OUTPATIENT
Start: 2021-07-19 | End: 2021-09-24 | Stop reason: SDUPTHER

## 2021-07-19 RX ORDER — CANAGLIFLOZIN 100 MG/1
100 TABLET, FILM COATED ORAL
Qty: 90 TABLET | Refills: 1 | Status: SHIPPED | OUTPATIENT
Start: 2021-07-19

## 2021-07-19 RX ORDER — CITALOPRAM 20 MG/1
TABLET ORAL
Qty: 45 TABLET | Refills: 2 | Status: SHIPPED | OUTPATIENT
Start: 2021-07-19 | End: 2021-09-24 | Stop reason: SDUPTHER

## 2021-07-19 NOTE — PATIENT INSTRUCTIONS
Patient Education        Learning About the Glycemic Index  What is the glycemic index? The glycemic index (GI) is a rating system for foods that contain carbohydrate. It helps you know how quickly these foods raise blood sugar. Carbohydrate raises blood sugar more quickly than other nutrients, like proteins and fats. Some carbohydrate foods raise blood sugar faster than others. · Low glycemic foods release sugar into the blood slowly. · High glycemic foods make blood sugar rise quickly. How does it work? Foods in the index are ranked by number. · High glycemic index foods are rated 70 and above. · Medium glycemic index foods are 56 to 69. · Low glycemic index foods are 55 or less. What do you need to know? Some people who have diabetes use the glycemic index to help them plan meals and manage blood sugar. · If you have diabetes, talk with your doctor, a dietitian, or a certified diabetes educator before using a low glycemic index eating plan. · Eating low glycemic foods is most helpful when used along with another eating plan for diabetes, such as carbohydrate counting. Counting carbs helps you know how much carbohydrate you're eating. The amount of carbohydrate you eat is more important than the glycemic index of foods in helping you control your blood sugar. · The rating of a food can change depending on ripeness, how it is prepared (juiced, mashed, ground), how it is cooked, and how long it is stored. · People respond differently to the glycemic content of foods. Many things affect the glycemic index. The only way to know for sure how a food affects your blood sugar is to check your blood sugar before and after you eat that food. · High GI foods are rarely eaten on their own. This means that the glycemic index might not be helpful unless you're eating a food by itself. Eating foods together can change their rating. What are examples of foods in the glycemic index?   · High glycemic index foods

## 2021-07-19 NOTE — PROGRESS NOTES
Subjective:      Patient ID: Brittaney Moura is a 49 y.o. female who presents today for:     Chief Complaint   Patient presents with    Results     Patient presents today to discuss lab results. HPI Pt continues to have pain in sacrum shooting up through hips and in groin/pelvis. She reports that it has not improved. She is concerned as it has been on going for so long. She reports that it seems to start in the back and shoot through the front. Gabapentin is mildly helpful. She would like to discuss increased dose. She reports she would also like to see about changing Dm2 medication. Insurance is not covering jardiance and it will cost a couple of hundred out of pocket. She has been taking medications regularly again and doing well. TBT mesh was taken from rectum- Noted for colonoscopy. Added to bladder suspension note. Past Medical History:   Diagnosis Date    Breast disorder     LEFT LUMP 1990 BENIGN    Depression     Diabetes mellitus ([facility] [address]     Menopausal symptoms     Overactive bladder     Stress incontinence      Past Surgical History:   Procedure Laterality Date    BLADDER SUSPENSION      TBT Mesh removal- 5/2020 Some in rectum    HYSTERECTOMY      LEEP       Family History   Problem Relation Age of Onset    Breast Cancer Mother      Social History     Socioeconomic History    Marital status:      Spouse name: Not on file    Number of children: Not on file    Years of education: Not on file    Highest education level: Not on file   Occupational History    Not on file   Tobacco Use    Smoking status: Never Smoker    Smokeless tobacco: Never Used   Substance and Sexual Activity    Alcohol use:  Yes    Drug use: Never    Sexual activity: Not on file   Other Topics Concern    Not on file   Social History Narrative    Not on file     Social Determinants of Health     Financial Resource Strain: Low Risk     Difficulty of Paying Living Expenses: Not hard at all   Food Insecurity: No Food Insecurity    Worried About Running Out of Food in the Last Year: Never true    Ran Out of Food in the Last Year: Never true   Transportation Needs:     Lack of Transportation (Medical):  Lack of Transportation (Non-Medical):    Physical Activity:     Days of Exercise per Week:     Minutes of Exercise per Session:    Stress:     Feeling of Stress :    Social Connections:     Frequency of Communication with Friends and Family:     Frequency of Social Gatherings with Friends and Family:     Attends Gnosticist Services:     Active Member of Clubs or Organizations:     Attends Club or Organization Meetings:     Marital Status:    Intimate Partner Violence:     Fear of Current or Ex-Partner:     Emotionally Abused:     Physically Abused:     Sexually Abused:      Current Outpatient Medications on File Prior to Visit   Medication Sig Dispense Refill    estradiol (ESTRACE) 1 MG tablet Take 1 tablet by mouth daily 30 tablet 3    estrogens, conjugated, (PREMARIN) 0.625 MG tablet Take 1 tablet by mouth daily 30 tablet 5    traZODone (DESYREL) 50 MG tablet Take 2 tablets by mouth at bedtime as needed for insomnia. 60 tablet 0    conjugated estrogens (PREMARIN) 0.625 MG/GM vaginal cream Place 0.5 g vaginally daily For 30 days 1 Tube 3    estradiol (ESTRACE) 0.1 MG/GM vaginal cream       metFORMIN (GLUCOPHAGE) 1000 MG tablet       clotrimazole-betamethasone (LOTRISONE) 1-0.05 % cream Apply topically 2 times daily. 1 Tube 6    fluconazole (DIFLUCAN) 150 MG tablet Take 1 tablet by mouth daily 30 tablet 0     No current facility-administered medications on file prior to visit. Allergies:  Nitrates, organic    Review of Systems   Constitutional: Negative for chills, fatigue and fever. HENT: Negative for congestion and ear pain. Respiratory: Negative for cough, shortness of breath and wheezing. Cardiovascular: Negative for chest pain, palpitations and leg swelling. Gastrointestinal: Positive for abdominal pain. Genitourinary: Negative for difficulty urinating. Musculoskeletal: Positive for arthralgias, back pain, gait problem and myalgias. Skin: Negative for color change, rash and wound. Neurological: Negative for dizziness and headaches. Objective:   /82 (Site: Left Upper Arm, Position: Sitting, Cuff Size: Medium Adult)   Pulse 83   Temp 97.3 °F (36.3 °C) (Temporal)   Ht 5' 6\" (1.676 m)   Wt 155 lb 6.4 oz (70.5 kg)   SpO2 96%   Breastfeeding No   BMI 25.08 kg/m²     Physical Exam  Constitutional:       Appearance: She is well-developed. HENT:      Head: Normocephalic. Right Ear: Tympanic membrane, ear canal and external ear normal.      Left Ear: Tympanic membrane, ear canal and external ear normal.      Nose: Nose normal.      Mouth/Throat:      Mouth: Mucous membranes are moist.      Pharynx: Oropharynx is clear. Uvula midline. Eyes:      General:         Right eye: No discharge. Left eye: No discharge. Conjunctiva/sclera: Conjunctivae normal.   Cardiovascular:      Rate and Rhythm: Normal rate and regular rhythm. Heart sounds: Normal heart sounds. Pulmonary:      Effort: Pulmonary effort is normal. No respiratory distress. Breath sounds: Normal breath sounds. Abdominal:      Tenderness: There is generalized abdominal tenderness. Musculoskeletal:      Cervical back: Normal range of motion. Lumbar back: Spasms, tenderness and bony tenderness present. No swelling, edema, deformity, signs of trauma or lacerations. Decreased range of motion. Back:    Lymphadenopathy:      Cervical: No cervical adenopathy. Skin:     General: Skin is warm and dry. Neurological:      Mental Status: She is alert and oriented to person, place, and time. Psychiatric:         Mood and Affect: Mood normal.         Behavior: Behavior normal.         Assessment:          Diagnosis Orders   1.  Type 2 diabetes mellitus without complication, without long-term current use of insulin (HCC)  canagliflozin (INVOKANA) 100 MG TABS tablet   2. Neuropathy  gabapentin (NEURONTIN) 300 MG capsule   3. Coccyx pain  MRI LUMBAR SPINE WO CONTRAST   4. Hip pain  MRI LUMBAR SPINE WO CONTRAST       Plan:      Orders Placed This Encounter   Procedures    MRI LUMBAR SPINE WO CONTRAST     Standing Status:   Future     Standing Expiration Date:   7/19/2022     Order Specific Question:   Reason for exam:     Answer:   coccyx, hip, pelvic pain x years. Orders Placed This Encounter   Medications    gabapentin (NEURONTIN) 300 MG capsule     Sig: Take 1 capsule by mouth 3 times daily for 90 days. Dispense:  90 capsule     Refill:  2    canagliflozin (INVOKANA) 100 MG TABS tablet     Sig: Take 1 tablet by mouth every morning (before breakfast)     Dispense:  90 tablet     Refill:  1    citalopram (CELEXA) 20 MG tablet     Sig: take 1 and 1/2 tablets by mouth once daily. Dispense:  45 tablet     Refill:  2       Return in about 2 months (around 9/19/2021). Start MRI pending results will get CT abdomen pelvis. Dm2- stop jardiance start invokana. Neuropathy- increase gabapentin. Reviewed with the patient: current clinicalstatus, medications, activities and diet. Side effects, adverse effects of the medication prescribedtoday, as well as treatment plan/ rationale and result expectations have been discussedwith the patient who expresses understanding and desires to proceed. Close follow upto evaluate treatment results and for coordination of care. I have reviewedthe patient's medical history in detail and updated the computerized patient record.     Elizabeth Suazo, APRN - CNP

## 2021-07-21 ASSESSMENT — ENCOUNTER SYMPTOMS
COUGH: 0
SHORTNESS OF BREATH: 0
BACK PAIN: 1
COLOR CHANGE: 0
ABDOMINAL PAIN: 1
WHEEZING: 0

## 2021-08-02 ENCOUNTER — HOSPITAL ENCOUNTER (OUTPATIENT)
Dept: WOMENS IMAGING | Age: 56
Discharge: HOME | End: 2021-08-04
Payer: COMMERCIAL

## 2021-08-02 DIAGNOSIS — Z12.31 ENCOUNTER FOR SCREENING MAMMOGRAM FOR BREAST CANCER: ICD-10-CM

## 2021-08-02 PROCEDURE — 77063 BREAST TOMOSYNTHESIS BI: CPT

## 2021-08-09 ENCOUNTER — TELEPHONE (OUTPATIENT)
Dept: OBGYN CLINIC | Age: 56
End: 2021-08-09

## 2021-08-09 DIAGNOSIS — R92.8 ABNORMAL MAMMOGRAM: Primary | ICD-10-CM

## 2021-08-12 ENCOUNTER — HOSPITAL ENCOUNTER (OUTPATIENT)
Dept: ULTRASOUND IMAGING | Age: 56
Discharge: HOME | End: 2021-08-14
Payer: COMMERCIAL

## 2021-08-12 ENCOUNTER — HOSPITAL ENCOUNTER (OUTPATIENT)
Dept: WOMENS IMAGING | Age: 56
Discharge: HOME | End: 2021-08-14
Payer: COMMERCIAL

## 2021-08-12 DIAGNOSIS — R92.8 ABNORMAL MAMMOGRAM: ICD-10-CM

## 2021-08-12 PROCEDURE — 76642 ULTRASOUND BREAST LIMITED: CPT

## 2021-08-12 PROCEDURE — 77065 DX MAMMO INCL CAD UNI: CPT

## 2021-08-18 ENCOUNTER — PATIENT MESSAGE (OUTPATIENT)
Dept: OBGYN CLINIC | Age: 56
End: 2021-08-18

## 2021-08-18 DIAGNOSIS — Z12.31 ENCOUNTER FOR SCREENING MAMMOGRAM FOR BREAST CANCER: Primary | ICD-10-CM

## 2021-08-18 DIAGNOSIS — R92.8 ABNORMAL MAMMOGRAM: ICD-10-CM

## 2021-08-19 NOTE — TELEPHONE ENCOUNTER
From: Randal Donahue  To: Ubaldo Murillo DO  Sent: 8/18/2021 11:16 AM EDT  Subject: Test Results Question    Dr. Yeison Hutchinson,   Please enter an order for me to get a 2D/3D mammogram and ultrasound for February, 2022. My recent mammogram was flagged as abnormal but they had no actual findings. Recheck in 6 months.    Thanks,   Randal Donahue

## 2021-08-23 ENCOUNTER — PATIENT MESSAGE (OUTPATIENT)
Dept: OBGYN CLINIC | Age: 56
End: 2021-08-23

## 2021-08-23 NOTE — TELEPHONE ENCOUNTER
From: Ramya Liu  To: Eliz Cotton DO  Sent: 8/23/2021 7:53 AM EDT  Subject: Test Results Question    Dr. Rosita Cranker,     Can you review and explain to me my mammogram and additional testing results. I'm wondering if I should be getting a \"second opinion\" rather than waiting 6 more months. Please let me know if I need an appointment. I'd appreciate your opinion.      Thanks,  [facility]

## 2021-08-25 ENCOUNTER — TELEPHONE (OUTPATIENT)
Dept: OBGYN CLINIC | Age: 56
End: 2021-08-25

## 2021-08-25 RX ORDER — TRAZODONE HYDROCHLORIDE 50 MG/1
TABLET ORAL
Qty: 60 TABLET | Refills: 0 | Status: SHIPPED | OUTPATIENT
Start: 2021-08-25 | End: 2021-09-24 | Stop reason: SDUPTHER

## 2021-08-25 NOTE — TELEPHONE ENCOUNTER
Dr. Noelle Vang has been giving her short-term refills. I'll give her another short-term refill, but she has a primary care provider. Please suggest that she receive a long-term prescription from her PCP.

## 2021-09-16 RX ORDER — ESTRADIOL 1 MG/1
1 TABLET ORAL DAILY
Qty: 30 TABLET | Refills: 3 | Status: SHIPPED | OUTPATIENT
Start: 2021-09-16 | End: 2021-10-21 | Stop reason: SDUPTHER

## 2021-09-24 DIAGNOSIS — G62.9 NEUROPATHY: ICD-10-CM

## 2021-09-24 RX ORDER — TRAZODONE HYDROCHLORIDE 50 MG/1
TABLET ORAL
Qty: 180 TABLET | Refills: 0 | Status: SHIPPED | OUTPATIENT
Start: 2021-09-24 | End: 2021-12-06

## 2021-09-24 RX ORDER — CITALOPRAM 20 MG/1
TABLET ORAL
Qty: 135 TABLET | Refills: 0 | Status: SHIPPED | OUTPATIENT
Start: 2021-09-24 | End: 2022-01-03

## 2021-09-24 RX ORDER — GABAPENTIN 300 MG/1
300 CAPSULE ORAL 3 TIMES DAILY
Qty: 270 CAPSULE | Refills: 0 | Status: SHIPPED | OUTPATIENT
Start: 2021-09-24 | End: 2021-12-23

## 2021-09-24 RX ORDER — ESTRADIOL 1 MG/1
1 TABLET ORAL DAILY
Qty: 90 TABLET | Refills: 0 | OUTPATIENT
Start: 2021-09-24

## 2021-09-24 NOTE — TELEPHONE ENCOUNTER
Requesting medication refill. Please approve or deny this request.    Rx requested:  Requested Prescriptions     Pending Prescriptions Disp Refills    citalopram (CELEXA) 20 MG tablet 135 tablet 0     Sig: take 1 and 1/2 tablets by mouth once daily.  gabapentin (NEURONTIN) 300 MG capsule 270 capsule 0     Sig: Take 1 capsule by mouth 3 times daily for 90 days.  traZODone (DESYREL) 50 MG tablet 180 tablet 0     Sig: Take 2 tablets by mouth at bedtime as needed for insomnia.     estradiol (ESTRACE) 1 MG tablet 90 tablet 0     Sig: Take 1 tablet by mouth daily       Last Office Visit:   7/19/2021    Last Filled:      Last Labs:      Next Visit Date:  Future Appointments   Date Time Provider Justina Glaser   9/27/2021  4:30 PM FRANTZ Tim CNP [facility]   5/13/2022  8:30 AM Jourdan Cates DO [facility] [address]

## 2021-09-28 RX ORDER — ESTRADIOL 1 MG/1
1 TABLET ORAL DAILY
Qty: 90 TABLET | Refills: 0 | OUTPATIENT
Start: 2021-09-28

## 2021-09-28 NOTE — TELEPHONE ENCOUNTER
Requesting medication refill.  Please approve or deny this request.    Rx requested:  Requested Prescriptions     Pending Prescriptions Disp Refills    estradiol (ESTRACE) 1 MG tablet 90 tablet 0     Sig: Take 1 tablet by mouth daily       Last Office Visit:   7/19/2021    Last Filled:      Last Labs:      Next Visit Date:  Future Appointments   Date Time Provider Justina Glaser   5/13/2022  8:30 AM David Poon, DO [address]

## 2021-10-08 RX ORDER — ESTRADIOL 1 MG/1
1 TABLET ORAL DAILY
Qty: 90 TABLET | Refills: 0 | OUTPATIENT
Start: 2021-10-08

## 2021-10-08 NOTE — TELEPHONE ENCOUNTER
Requesting medication refill.  Please approve or deny this request.    Rx requested:  Requested Prescriptions     Pending Prescriptions Disp Refills    estradiol (ESTRACE) 1 MG tablet 90 tablet 0     Sig: Take 1 tablet by mouth daily       Last Office Visit:   7/19/2021    Last Filled:      Last Labs:      Next Visit Date:  Future Appointments   Date Time Provider Justina Glaser   5/13/2022  8:30 AM Rabia Kent DO [address] [place] [address]

## 2021-10-08 NOTE — TELEPHONE ENCOUNTER
[facility]  Clinical Staff  Subject: Refill Request     QUESTIONS   Name of Medication? estradiol (ESTRACE) 1 MG tablet   Patient-reported dosage and instructions? 1 mg   How many days do you have left? 0   Preferred Pharmacy? [facility] phone number (if available)? [phone]   Additional Information for Provider? [facility] requesting 90 day   supply with 3 refills on medication   ---------------------------------------------------------------------------   --------------   CALL BACK INFO   What is the best way for the office to contact you? OK to leave message on   voicemail   Preferred Call Back Phone Number?  [phone]

## 2021-10-20 ENCOUNTER — PATIENT MESSAGE (OUTPATIENT)
Dept: OBGYN CLINIC | Age: 56
End: 2021-10-20

## 2021-10-20 NOTE — TELEPHONE ENCOUNTER
From: Josephien Valadez  To: Mirna Flores DO  Sent: 10/20/2021 10:08 AM EDT  Subject: Prescription Question    Hello - Can you please renew my scripts for Estradiol 1 mg. and the Fluconazole 150 mg. with refills. [facility] [phone].  Thanks, Rajendra Underwood

## 2021-10-21 RX ORDER — FLUCONAZOLE 150 MG/1
150 TABLET ORAL ONCE
Qty: 4 TABLET | Refills: 3 | Status: SHIPPED | OUTPATIENT
Start: 2021-10-21 | End: 2021-10-21

## 2021-10-21 RX ORDER — ESTRADIOL 1 MG/1
1 TABLET ORAL DAILY
Qty: 30 TABLET | Refills: 3 | Status: SHIPPED | OUTPATIENT
Start: 2021-10-21 | End: 2022-01-26 | Stop reason: SDUPTHER

## 2022-01-21 RX ORDER — ESTRADIOL 1 MG/1
1 TABLET ORAL DAILY
Qty: 90 TABLET | Refills: 0 | OUTPATIENT
Start: 2022-01-21

## 2022-01-21 NOTE — TELEPHONE ENCOUNTER
Requesting medication refill.  Please approve or deny this request.    Rx requested:  Requested Prescriptions     Pending Prescriptions Disp Refills    estradiol (ESTRACE) 1 MG tablet 90 tablet 0     Sig: Take 1 tablet by mouth daily       Last Office Visit:   7/19/2021    Last Filled:      Last Labs:      Next Visit Date:  Future Appointments   Date Time Provider Justina Glaser   5/13/2022  8:30 AM Bashir Johnson DO [address] [place] [address]

## 2022-01-26 ENCOUNTER — PATIENT MESSAGE (OUTPATIENT)
Dept: FAMILY MEDICINE CLINIC | Age: 57
End: 2022-01-26

## 2022-01-26 NOTE — TELEPHONE ENCOUNTER
From: Jomar Silva  To: Margot Sahu  Sent: 1/26/2022 3:07 PM EST  Subject: Glipizide     Hello, Can I get a refill of Glipizide 5mg /2X day from [facility] 90 day supply. Last time was from Dr. Vaibhav Thornton.  Thanks Pocits

## 2022-02-10 ENCOUNTER — HOSPITAL ENCOUNTER (OUTPATIENT)
Dept: ULTRASOUND IMAGING | Age: 57
Discharge: HOME | End: 2022-02-12
Payer: COMMERCIAL

## 2022-02-10 ENCOUNTER — HOSPITAL ENCOUNTER (OUTPATIENT)
Dept: WOMENS IMAGING | Age: 57
Discharge: HOME | End: 2022-02-12
Payer: COMMERCIAL

## 2022-02-10 DIAGNOSIS — Z12.31 ENCOUNTER FOR SCREENING MAMMOGRAM FOR BREAST CANCER: ICD-10-CM

## 2022-02-10 PROCEDURE — G0279 TOMOSYNTHESIS, MAMMO: HCPCS

## 2022-03-07 RX ORDER — TRAZODONE HYDROCHLORIDE 50 MG/1
TABLET ORAL
Qty: 180 TABLET | Refills: 3 | Status: SHIPPED | OUTPATIENT
Start: 2022-03-07

## 2022-03-23 ENCOUNTER — PATIENT MESSAGE (OUTPATIENT)
Dept: OBGYN CLINIC | Age: 57
End: 2022-03-23

## 2022-03-23 RX ORDER — FLUCONAZOLE 150 MG/1
150 TABLET ORAL DAILY
Qty: 30 TABLET | Refills: 3 | Status: SHIPPED | OUTPATIENT
Start: 2022-03-23

## 2022-03-23 NOTE — TELEPHONE ENCOUNTER
From: Esperanza Norris  To: Dr. Gilford Daniel: 3/23/2022 8:37 AM EDT  Subject: Fluconazole 150 mg     Hi Dr. Marsha Brown, I need a refill of the fluconazole with refills, please. I know I have an upcoming appt. with you in May.  Please call in to [facility]  Thanks Malcolm Gaines

## 2022-05-13 ENCOUNTER — OFFICE VISIT (OUTPATIENT)
Dept: OBGYN CLINIC | Age: 57
End: 2022-05-13
Payer: COMMERCIAL

## 2022-05-13 VITALS
DIASTOLIC BLOOD PRESSURE: 72 MMHG | SYSTOLIC BLOOD PRESSURE: 126 MMHG | HEIGHT: 66 IN | BODY MASS INDEX: 24.43 KG/M2 | WEIGHT: 152 LBS

## 2022-05-13 DIAGNOSIS — Z11.51 SCREENING FOR HUMAN PAPILLOMAVIRUS: ICD-10-CM

## 2022-05-13 DIAGNOSIS — Z12.31 SCREENING MAMMOGRAM, ENCOUNTER FOR: ICD-10-CM

## 2022-05-13 DIAGNOSIS — B37.31 VULVOVAGINITIS DUE TO YEAST: ICD-10-CM

## 2022-05-13 DIAGNOSIS — Z01.419 PAP SMEAR, AS PART OF ROUTINE GYNECOLOGICAL EXAMINATION: Primary | ICD-10-CM

## 2022-05-13 DIAGNOSIS — Z01.419 PAP SMEAR, AS PART OF ROUTINE GYNECOLOGICAL EXAMINATION: ICD-10-CM

## 2022-05-13 PROCEDURE — 99396 PREV VISIT EST AGE 40-64: CPT | Performed by: OBSTETRICS & GYNECOLOGY

## 2022-05-13 ASSESSMENT — ENCOUNTER SYMPTOMS
BLOOD IN STOOL: 0
SHORTNESS OF BREATH: 0
BACK PAIN: 0
VOICE CHANGE: 0
ABDOMINAL PAIN: 0
SORE THROAT: 0
VOMITING: 0
WHEEZING: 0
NAUSEA: 0
COUGH: 0
COLOR CHANGE: 0
ABDOMINAL DISTENTION: 0
CONSTIPATION: 0
TROUBLE SWALLOWING: 0
CHEST TIGHTNESS: 0

## 2022-05-13 NOTE — PROGRESS NOTES
CHIEF COMPLAINT: Patient continues to have vaginal discomfort and hip and pelvic pain from the mesh that she had placed in the past that had to be surgically removed. Is using a milligram of Estrace Estrace a day as well as Premarin vaginal cream twice weekly. She states that during intercourse she is doing okay but has discomfort for a couple of hours after  Chief Complaint   Patient presents with    Annual Exam     DONG/BSO last pap was in 2009         HISTORY OF PRESENT ILLNESS:  49 y.o. female presents for her annual exam. She had no concernsor complaints today. Her menses is absent. She denies breakthrough bleeding, pelvic pain, or abnormal discharge. Bowel and bladder function is normal. Her health overallhas been good. Past Medical History:   Diagnosis Date    Breast disorder     LEFT LUMP 1990 BENIGN    Depression     Diabetes mellitus ([facility] [address]     Menopausal symptoms     Overactive bladder     Stress incontinence      Past Surgical History:   Procedure Laterality Date    BLADDER SUSPENSION      TBT Mesh removal- 5/2020 Some in rectum    BREAST BIOPSY      BREAST SURGERY      HYSTERECTOMY      DONG/BSO    LEEP       Family History   Problem Relation Age of Onset    Breast Cancer Mother      Social History     Socioeconomic History    Marital status:      Spouse name: Not on file    Number of children: Not on file    Years of education: Not on file    Highest education level: Not on file   Occupational History    Not on file   Tobacco Use    Smoking status: Never Smoker    Smokeless tobacco: Never Used   Substance and Sexual Activity    Alcohol use:  Yes    Drug use: Never    Sexual activity: Not on file   Other Topics Concern    Not on file   Social History Narrative    Not on file     Social Determinants of Health     Financial Resource Strain: Low Risk     Difficulty of Paying Living Expenses: Not hard at all   Food Insecurity: No Food Insecurity    Worried About Running Out of Food in the Last Year: Never true    Ran Out of Food in the Last Year: Never true   Transportation Needs:     Lack of Transportation (Medical): Not on file    Lack of Transportation (Non-Medical): Not on file   Physical Activity:     Days of Exercise per Week: Not on file    Minutes of Exercise per Session: Not on file   Stress:     Feeling of Stress : Not on file   Social Connections:     Frequency of Communication with Friends and Family: Not on file    Frequency of Social Gatherings with Friends and Family: Not on file    Attends Orthodoxy Services: Not on file    Active Member of [address] [place] [address] or Organizations: Not on file    Attends Club or Organization Meetings: Not on file    Marital Status: Not on file   Intimate Partner Violence:     Fear of Current or Ex-Partner: Not on file    Emotionally Abused: Not on file    Physically Abused: Not on file    Sexually Abused: Not on file   Housing Stability:     Unable to Pay for Housing in the Last Year: Not on file    Number of [place] in the Last Year: Not on file    Unstable Housing in the Last Year: Not on file     Allergies:  Nitrates, organic  Current Outpatient Medications on File Prior to Visit   Medication Sig Dispense Refill    fluconazole (DIFLUCAN) 150 MG tablet Take 1 tablet by mouth daily 30 tablet 3    traZODone (DESYREL) 50 MG tablet TAKE 2 TABLETS AT BEDTIME AS NEEDED FOR INSOMNIA 180 tablet 3    estradiol (ESTRACE) 1 MG tablet Take 1 tablet by mouth daily 90 tablet 1    citalopram (CELEXA) 20 MG tablet TAKE ONE AND ONE-HALF TABLETS ONCE DAILY 135 tablet 1    metFORMIN (GLUCOPHAGE) 1000 MG tablet       clotrimazole-betamethasone (LOTRISONE) 1-0.05 % cream Apply topically 2 times daily. 1 Tube 6    gabapentin (NEURONTIN) 300 MG capsule Take 1 capsule by mouth 3 times daily for 90 days.  270 capsule 0    canagliflozin (INVOKANA) 100 MG TABS tablet Take 1 tablet by mouth every morning (before breakfast) 90 tablet 1    estrogens, conjugated, (PREMARIN) 0.625 MG tablet Take 1 tablet by mouth daily 30 tablet 5    estradiol (ESTRACE) 0.1 MG/GM vaginal cream  (Patient not taking: Reported on 2022)       No current facility-administered medications on file prior to visit. OB History        1    Para   1    Term   1       0    AB   0    Living   1       SAB   0    IAB   0    Ectopic   0    Molar   0    Multiple   0    Live Births   1              Patient's medications, allergies, past medical, surgical,social and family histories were reviewed and updated as appropriate. Review of Systems   Constitutional: Negative for activity change, appetite change, fatigue and unexpected weight change. HENT: Negative for dental problem, ear pain, hearing loss, nosebleeds, sore throat, trouble swallowing and voice change. Eyes: Negative for visual disturbance. Respiratory: Negative for cough, chest tightness, shortness of breath and wheezing. Cardiovascular: Negative for chest pain and palpitations. Gastrointestinal: Negative for abdominal distention, abdominal pain, blood in stool, constipation, nausea and vomiting. Endocrine: Negative for cold intolerance, heat intolerance, polydipsia, polyphagia and polyuria. Genitourinary: Positive for dyspareunia and pelvic pain. Negative for difficulty urinating, dysuria, flank pain, frequency, genital sores, hematuria, menstrual problem, urgency, vaginal bleeding, vaginal discharge and vaginal pain. Musculoskeletal: Negative for arthralgias, back pain, joint swelling and myalgias. Skin: Negative for color change and rash. Allergic/Immunologic: Negative for environmental allergies, food allergies and immunocompromised state. Neurological: Negative for dizziness, seizures, syncope, speech difficulty, weakness, numbness and headaches. Hematological: Negative for adenopathy. Does not bruise/bleed easily.    Psychiatric/Behavioral: Negative for agitation, behavioral problems, confusion, decreased concentration, dysphoric mood and suicidal ideas. The patient is not nervous/anxious and is not hyperactive. All other systems reviewed and are negative. PHYSICAL EXAM  /72   Ht 5' 6\" (1.676 m)   Wt 152 lb (68.9 kg)   BMI 24.53 kg/m²     Physical Exam  Vitals and nursing note reviewed. Exam conducted with a chaperone present. Constitutional:       Appearance: She is well-developed. HENT:      Head: Normocephalic and atraumatic. Eyes:      Pupils: Pupils are equal, round, and reactive to light. Neck:      Thyroid: No thyromegaly. Trachea: No tracheal deviation. Cardiovascular:      Rate and Rhythm: Normal rate and regular rhythm. Heart sounds: Normal heart sounds. Pulmonary:      Effort: Pulmonary effort is normal.      Breath sounds: Normal breath sounds. Chest:      Chest wall: No tenderness. Abdominal:      General: Bowel sounds are normal. There is no distension. Palpations: Abdomen is soft. There is no mass. Tenderness: There is no abdominal tenderness. There is no guarding or rebound. Hernia: There is no hernia in the left inguinal area. Genitourinary:     Labia:         Right: No rash, tenderness, lesion or injury. Left: No rash, tenderness, lesion or injury. Vagina: Normal. No foreign body. No vaginal discharge, erythema, tenderness or bleeding. Adnexa:         Right: No mass, tenderness or fullness. Left: No mass, tenderness or fullness. Rectum: Normal. No mass, tenderness, anal fissure, external hemorrhoid or internal hemorrhoid. Normal anal tone. Musculoskeletal:         General: Normal range of motion. Cervical back: Normal range of motion. Lymphadenopathy:      Cervical: No cervical adenopathy. Neurological:      Mental Status: She is alert and oriented to person, place, and time.          ASSESSMENT : Routine Annual Exam      Diagnosis Orders 1. Pap smear, as part of routine gynecological examination     2. Screening for human papillomavirus     3. Screening mammogram, encounter for     pelvic Pain  Dyspareunia  PLAN: Continue the systemic estrogen daily milligram a day and add the or continue to use the Premarin vaginal cream twice weekly she does have some Lotrisone cream that she can use the vulva to heal those areas that are split patient will follow up here in 1 year  Pap smear obtained  No orders of the defined types were placed in this encounter. No orders of the defined types were placed in this encounter. Repeat Annual every 1 year. New ASCCP guidelines regarding pap and Hi Risk HPV co-testing reviewed. Cervical Cytology Evaluation begins at 24years old. If Negative Cytology, Follow-upscreening per current  ASCCP guidelines. Mammograms every 1 year. If 35 yo and last mammogram was negative. Calcium and Vitamin D dosing reviewed. Colonoscopy screening guidelines reviewed as well as onset forbone density testing. Birth control and barrier methods and recommendations discussed. STD counseling and prevention reviewed. Gardisil counseling completed for all patients 7-35 yo. Routine health maintenance per patients PCP.        Electronically signed by Jourdan Cates DO on 5/13/22

## 2022-05-14 LAB
CLUE CELLS: NORMAL
TRICHOMONAS PREP: NORMAL
TRICHOMONAS VAGINALIS SCREEN: NEGATIVE
YEAST WET PREP: NORMAL

## 2022-05-18 LAB
HPV COMMENT: NORMAL
HPV TYPE 16: NOT DETECTED
HPV TYPE 18: NOT DETECTED
HPVOH (OTHER TYPES): NOT DETECTED

## 2022-06-15 ENCOUNTER — PATIENT MESSAGE (OUTPATIENT)
Dept: FAMILY MEDICINE CLINIC | Age: 57
End: 2022-06-15

## 2022-06-16 RX ORDER — CITALOPRAM 20 MG/1
TABLET ORAL
Qty: 135 TABLET | Refills: 0 | Status: SHIPPED | OUTPATIENT
Start: 2022-06-16 | End: 2022-07-11

## 2022-06-21 ENCOUNTER — TELEPHONE (OUTPATIENT)
Dept: GASTROENTEROLOGY | Age: 57
End: 2022-06-21

## 2022-07-07 RX ORDER — ESTRADIOL 1 MG/1
TABLET ORAL
Qty: 90 TABLET | Refills: 3 | Status: SHIPPED | OUTPATIENT
Start: 2022-07-07 | End: 2022-08-03

## 2022-07-09 NOTE — TELEPHONE ENCOUNTER
requesting medication refill.  Please approve or deny this request.    Rx requested:  Requested Prescriptions     Pending Prescriptions Disp Refills    citalopram (CELEXA) 20 MG tablet [Pharmacy Med Name: CITALOPRAM HYDROBROMIDE TABS 20MG] 135 tablet 3     Sig: TAKE ONE AND ONE-HALF TABLETS ONCE DAILY         Last Office Visit:   7/19/2021      Next Visit Date:  Future Appointments   Date Time Provider Justina Glaser   5/19/2023  8:30 AM DO FROILAN Jacobo[address]

## 2022-07-11 RX ORDER — CITALOPRAM 20 MG/1
TABLET ORAL
Qty: 135 TABLET | Refills: 3 | Status: SHIPPED | OUTPATIENT
Start: 2022-07-11

## 2022-08-03 RX ORDER — ESTRADIOL 1 MG/1
TABLET ORAL
Qty: 30 TABLET | Refills: 9 | Status: SHIPPED | OUTPATIENT
Start: 2022-08-03

## 2022-08-25 ENCOUNTER — TELEPHONE (OUTPATIENT)
Dept: FAMILY MEDICINE CLINIC | Age: 57
End: 2022-08-25

## 2022-10-10 ENCOUNTER — OFFICE VISIT (OUTPATIENT)
Dept: FAMILY MEDICINE CLINIC | Age: 57
End: 2022-10-10
Payer: COMMERCIAL

## 2022-10-10 VITALS
WEIGHT: 152 LBS | HEIGHT: 66 IN | SYSTOLIC BLOOD PRESSURE: 130 MMHG | OXYGEN SATURATION: 95 % | DIASTOLIC BLOOD PRESSURE: 74 MMHG | TEMPERATURE: 97.8 F | BODY MASS INDEX: 24.43 KG/M2 | HEART RATE: 101 BPM

## 2022-10-10 DIAGNOSIS — B96.89 ACUTE BACTERIAL SINUSITIS: Primary | ICD-10-CM

## 2022-10-10 DIAGNOSIS — R05.1 ACUTE COUGH: ICD-10-CM

## 2022-10-10 DIAGNOSIS — J01.90 ACUTE BACTERIAL SINUSITIS: Primary | ICD-10-CM

## 2022-10-10 DIAGNOSIS — R09.81 NASAL CONGESTION: ICD-10-CM

## 2022-10-10 LAB
INFLUENZA A ANTIBODY: NORMAL
INFLUENZA B ANTIBODY: NORMAL
Lab: ABNORMAL
PERFORMING INSTRUMENT: ABNORMAL
QC PASS/FAIL: ABNORMAL
SARS-COV-2, POC: DETECTED

## 2022-10-10 PROCEDURE — 99213 OFFICE O/P EST LOW 20 MIN: CPT | Performed by: PHYSICIAN ASSISTANT

## 2022-10-10 PROCEDURE — 87804 INFLUENZA ASSAY W/OPTIC: CPT | Performed by: PHYSICIAN ASSISTANT

## 2022-10-10 PROCEDURE — 87426 SARSCOV CORONAVIRUS AG IA: CPT | Performed by: PHYSICIAN ASSISTANT

## 2022-10-10 RX ORDER — AMOXICILLIN 500 MG/1
500 CAPSULE ORAL 2 TIMES DAILY
Qty: 20 CAPSULE | Refills: 0 | Status: SHIPPED | OUTPATIENT
Start: 2022-10-10 | End: 2022-10-20

## 2022-10-10 RX ORDER — DEXTROMETHORPHAN HYDROBROMIDE AND PROMETHAZINE HYDROCHLORIDE 15; 6.25 MG/5ML; MG/5ML
5 SYRUP ORAL 4 TIMES DAILY PRN
Qty: 150 ML | Refills: 0 | Status: SHIPPED | OUTPATIENT
Start: 2022-10-10 | End: 2022-10-17

## 2022-10-10 SDOH — ECONOMIC STABILITY: FOOD INSECURITY: WITHIN THE PAST 12 MONTHS, THE FOOD YOU BOUGHT JUST DIDN'T LAST AND YOU DIDN'T HAVE MONEY TO GET MORE.: NEVER TRUE

## 2022-10-10 SDOH — ECONOMIC STABILITY: FOOD INSECURITY: WITHIN THE PAST 12 MONTHS, YOU WORRIED THAT YOUR FOOD WOULD RUN OUT BEFORE YOU GOT MONEY TO BUY MORE.: NEVER TRUE

## 2022-10-10 ASSESSMENT — PATIENT HEALTH QUESTIONNAIRE - PHQ9
2. FEELING DOWN, DEPRESSED OR HOPELESS: 0
SUM OF ALL RESPONSES TO PHQ QUESTIONS 1-9: 0
SUM OF ALL RESPONSES TO PHQ QUESTIONS 1-9: 0
1. LITTLE INTEREST OR PLEASURE IN DOING THINGS: 0
SUM OF ALL RESPONSES TO PHQ9 QUESTIONS 1 & 2: 0
SUM OF ALL RESPONSES TO PHQ QUESTIONS 1-9: 0
SUM OF ALL RESPONSES TO PHQ QUESTIONS 1-9: 0

## 2022-10-10 ASSESSMENT — ENCOUNTER SYMPTOMS
SINUS PAIN: 1
ABDOMINAL PAIN: 0
SHORTNESS OF BREATH: 0
TROUBLE SWALLOWING: 0
VOMITING: 0
SINUS PRESSURE: 1
DIARRHEA: 0
BACK PAIN: 0
CHEST TIGHTNESS: 0
COUGH: 1

## 2022-10-10 ASSESSMENT — SOCIAL DETERMINANTS OF HEALTH (SDOH): HOW HARD IS IT FOR YOU TO PAY FOR THE VERY BASICS LIKE FOOD, HOUSING, MEDICAL CARE, AND HEATING?: NOT HARD AT ALL

## 2022-10-10 NOTE — PROGRESS NOTES
Subjective:      Patient ID: Simón Woody is a 56 y.o. female who presents today for:  Chief Complaint   Patient presents with    Sinus Problem    Cough     Started off as nasal congestion        HPI  56year old female who presents with sinus pain and pressure, cough for the past two weeks.  Denies fever and chills    Past Medical History:   Diagnosis Date    Breast disorder     LEFT LUMP 1990 BENIGN    Depression     Diabetes mellitus ([address]     Menopausal symptoms     Overactive bladder     Stress incontinence      Past Surgical History:   Procedure Laterality Date    BLADDER SUSPENSION      TBT Mesh removal- 5/2020 Some in rectum    BREAST BIOPSY      BREAST SURGERY      HYSTERECTOMY      DONG/BSO    LEEP       Social History     Socioeconomic History    Marital status:      Spouse name: Not on file    Number of children: Not on file    Years of education: Not on file    Highest education level: Not on file   Occupational History    Not on file   Tobacco Use    Smoking status: Never    Smokeless tobacco: Never   Substance and Sexual Activity    Alcohol use: Yes    Drug use: Never    Sexual activity: Not on file   Other Topics Concern    Not on file   Social History Narrative    Not on file     Social Determinants of Health     Financial Resource Strain: Low Risk     Difficulty of Paying Living Expenses: Not hard at all   Food Insecurity: No Food Insecurity    Worried About Running Out of Food in the Last Year: Never true    Ran Out of Food in the Last Year: Never true   Transportation Needs: Not on file   Physical Activity: Not on file   Stress: Not on file   Social Connections: Not on file   Intimate Partner Violence: Not on file   Housing Stability: Not on file     Family History   Problem Relation Age of Onset    Breast Cancer Mother      Allergies   Allergen Reactions    Nitrates, Organic      FOOD ALLERGY     Current Outpatient Medications   Medication Sig Dispense Refill    amoxicillin (AMOXIL) 500 MG capsule Take 1 capsule by mouth 2 times daily for 10 days 20 capsule 0    promethazine-dextromethorphan (PROMETHAZINE-DM) 6.25-15 MG/5ML syrup Take 5 mLs by mouth 4 times daily as needed for Cough 150 mL 0    estradiol (ESTRACE) 1 MG tablet TAKE 1 TABLET BY MOUTH DAILY 30 tablet 9    citalopram (CELEXA) 20 MG tablet TAKE ONE AND ONE-HALF TABLETS ONCE DAILY 135 tablet 3    fluconazole (DIFLUCAN) 150 MG tablet Take 1 tablet by mouth daily 30 tablet 3    traZODone (DESYREL) 50 MG tablet TAKE 2 TABLETS AT BEDTIME AS NEEDED FOR INSOMNIA 180 tablet 3    canagliflozin (INVOKANA) 100 MG TABS tablet Take 1 tablet by mouth every morning (before breakfast) 90 tablet 1    estrogens, conjugated, (PREMARIN) 0.625 MG tablet Take 1 tablet by mouth daily 30 tablet 5    estradiol (ESTRACE) 0.1 MG/GM vaginal cream       metFORMIN (GLUCOPHAGE) 1000 MG tablet       clotrimazole-betamethasone (LOTRISONE) 1-0.05 % cream Apply topically 2 times daily. 1 Tube 6    gabapentin (NEURONTIN) 300 MG capsule Take 1 capsule by mouth 3 times daily for 90 days. 270 capsule 0     No current facility-administered medications for this visit. Review of Systems   Constitutional:  Negative for activity change, appetite change, chills, fever and unexpected weight change. HENT:  Positive for congestion, sinus pressure, sinus pain and sneezing. Negative for drooling, ear pain, nosebleeds and trouble swallowing. Respiratory:  Positive for cough. Negative for chest tightness and shortness of breath. Cardiovascular:  Negative for chest pain and leg swelling. Gastrointestinal:  Negative for abdominal pain, diarrhea and vomiting. Endocrine: Negative for polydipsia and polyphagia. Genitourinary:  Negative for dysuria, flank pain and frequency. Musculoskeletal:  Negative for back pain and myalgias. Skin:  Negative for pallor and rash. Neurological:  Negative for syncope, weakness and headaches.    Hematological:  Does not bruise/bleed easily. Psychiatric/Behavioral:  Negative for agitation, behavioral problems and confusion. All other systems reviewed and are negative. Objective:   /74   Pulse (!) 101   Temp 97.8 °F (36.6 °C) (Temporal)   Ht 5' 6\" (1.676 m)   Wt 152 lb (68.9 kg)   SpO2 95%   BMI 24.53 kg/m²     Physical Exam    Assessment:       Diagnosis Orders   1. Acute bacterial sinusitis  amoxicillin (AMOXIL) 500 MG capsule    promethazine-dextromethorphan (PROMETHAZINE-DM) 6.25-15 MG/5ML syrup      2. Nasal congestion  POCT Influenza A/B    POCT COVID-19, Antigen    amoxicillin (AMOXIL) 500 MG capsule    promethazine-dextromethorphan (PROMETHAZINE-DM) 6.25-15 MG/5ML syrup      3. Acute cough  promethazine-dextromethorphan (PROMETHAZINE-DM) 6.25-15 MG/5ML syrup        Results for POC orders placed in visit on 10/10/22   POCT Influenza A/B   Result Value Ref Range    Influenza A Ab -     Influenza B Ab -       Plan:     Assessment & Plan   Orion Thompson was seen today for sinus problem and cough. Diagnoses and all orders for this visit:    Acute bacterial sinusitis  -     amoxicillin (AMOXIL) 500 MG capsule; Take 1 capsule by mouth 2 times daily for 10 days  -     promethazine-dextromethorphan (PROMETHAZINE-DM) 6.25-15 MG/5ML syrup; Take 5 mLs by mouth 4 times daily as needed for Cough    Nasal congestion  -     POCT Influenza A/B  -     POCT COVID-19, Antigen  -     amoxicillin (AMOXIL) 500 MG capsule; Take 1 capsule by mouth 2 times daily for 10 days  -     promethazine-dextromethorphan (PROMETHAZINE-DM) 6.25-15 MG/5ML syrup; Take 5 mLs by mouth 4 times daily as needed for Cough    Acute cough  -     promethazine-dextromethorphan (PROMETHAZINE-DM) 6.25-15 MG/5ML syrup; Take 5 mLs by mouth 4 times daily as needed for Cough    Orders Placed This Encounter   Procedures    POCT Influenza A/B    POCT COVID-19, Antigen     Order Specific Question:   Is this test for diagnosis or screening?      Answer:   Screening     Order Specific Question:   Symptomatic for COVID-19 as defined by CDC? Answer:   No     Order Specific Question:   Date of Symptom Onset     Answer:   N/A     Order Specific Question:   Hospitalized for COVID-19? Answer:   No     Order Specific Question:   Admitted to ICU for COVID-19? Answer:   No     Order Specific Question:   Employed in healthcare setting? Answer:   Unknown     Order Specific Question:   Resident in a congregate (group) care setting? Answer:   Unknown     Order Specific Question:   Pregnant? Answer:   No     Order Specific Question:   Previously tested for COVID-19? Answer:   Unknown     Orders Placed This Encounter   Medications    amoxicillin (AMOXIL) 500 MG capsule     Sig: Take 1 capsule by mouth 2 times daily for 10 days     Dispense:  20 capsule     Refill:  0    promethazine-dextromethorphan (PROMETHAZINE-DM) 6.25-15 MG/5ML syrup     Sig: Take 5 mLs by mouth 4 times daily as needed for Cough     Dispense:  150 mL     Refill:  0     There are no discontinued medications. Return if symptoms worsen or fail to improve. Reviewed with the patient/family: current clinical status & medications. Side effects of the medication prescribed today, as well as treatment plan/rationale and result expectations have been discussed with the patient/family who expresses understanding. Patient will be discharged home in stable condition. Follow up with PCP to evaluate treatment results or return if symptoms worsen or fail to improve. Discussed signs and symptoms which require immediate follow-up in ED/call to 911. Understanding verbalized. I have reviewed the patient's medical history in detail and updated the computerized patient record.     HAYLEY Lackey

## 2022-12-05 ENCOUNTER — OFFICE VISIT (OUTPATIENT)
Dept: FAMILY MEDICINE CLINIC | Age: 57
End: 2022-12-05
Payer: COMMERCIAL

## 2022-12-05 VITALS
HEART RATE: 78 BPM | WEIGHT: 151 LBS | BODY MASS INDEX: 24.27 KG/M2 | SYSTOLIC BLOOD PRESSURE: 130 MMHG | HEIGHT: 66 IN | DIASTOLIC BLOOD PRESSURE: 74 MMHG

## 2022-12-05 DIAGNOSIS — E11.9 TYPE 2 DIABETES MELLITUS WITHOUT COMPLICATION, WITHOUT LONG-TERM CURRENT USE OF INSULIN (HCC): Primary | ICD-10-CM

## 2022-12-05 DIAGNOSIS — E11.9 TYPE 2 DIABETES MELLITUS WITHOUT COMPLICATION, WITHOUT LONG-TERM CURRENT USE OF INSULIN (HCC): ICD-10-CM

## 2022-12-05 DIAGNOSIS — Z12.31 SCREENING MAMMOGRAM FOR BREAST CANCER: ICD-10-CM

## 2022-12-05 LAB
ALBUMIN SERPL-MCNC: 4.5 G/DL (ref 3.5–4.6)
ALP BLD-CCNC: 95 U/L (ref 40–130)
ALT SERPL-CCNC: 30 U/L (ref 0–33)
ANION GAP SERPL CALCULATED.3IONS-SCNC: 20 MEQ/L (ref 9–15)
AST SERPL-CCNC: 22 U/L (ref 0–35)
BASOPHILS ABSOLUTE: 0 K/UL (ref 0–0.2)
BASOPHILS RELATIVE PERCENT: 0.6 %
BILIRUB SERPL-MCNC: 0.7 MG/DL (ref 0.2–0.7)
BUN BLDV-MCNC: 11 MG/DL (ref 6–20)
CALCIUM SERPL-MCNC: 10 MG/DL (ref 8.5–9.9)
CHLORIDE BLD-SCNC: 98 MEQ/L (ref 95–107)
CHOLESTEROL, TOTAL: 242 MG/DL (ref 0–199)
CO2: 19 MEQ/L (ref 20–31)
CREAT SERPL-MCNC: 0.62 MG/DL (ref 0.5–0.9)
CREATININE URINE: 240 MG/DL
EOSINOPHILS ABSOLUTE: 0.1 K/UL (ref 0–0.7)
EOSINOPHILS RELATIVE PERCENT: 1.3 %
GFR SERPL CREATININE-BSD FRML MDRD: >60 ML/MIN/{1.73_M2}
GLOBULIN: 2.9 G/DL (ref 2.3–3.5)
GLUCOSE BLD-MCNC: 297 MG/DL (ref 70–99)
HBA1C MFR BLD: 11.9 %
HCT VFR BLD CALC: 43.7 % (ref 37–47)
HDLC SERPL-MCNC: 30 MG/DL (ref 40–59)
HEMOGLOBIN: 15.4 G/DL (ref 12–16)
LDL CHOLESTEROL CALCULATED: ABNORMAL MG/DL (ref 0–129)
LYMPHOCYTES ABSOLUTE: 2.4 K/UL (ref 1–4.8)
LYMPHOCYTES RELATIVE PERCENT: 36.4 %
MCH RBC QN AUTO: 30.7 PG (ref 27–31.3)
MCHC RBC AUTO-ENTMCNC: 35.3 % (ref 33–37)
MCV RBC AUTO: 86.9 FL (ref 79.4–94.8)
MICROALBUMIN UR-MCNC: 4.8 MG/DL
MICROALBUMIN/CREAT UR-RTO: 20 MG/G (ref 0–30)
MONOCYTES ABSOLUTE: 0.5 K/UL (ref 0.2–0.8)
MONOCYTES RELATIVE PERCENT: 7.5 %
NEUTROPHILS ABSOLUTE: 3.6 K/UL (ref 1.4–6.5)
NEUTROPHILS RELATIVE PERCENT: 54.2 %
PDW BLD-RTO: 12.7 % (ref 11.5–14.5)
PLATELET # BLD: 251 K/UL (ref 130–400)
POTASSIUM SERPL-SCNC: 4.1 MEQ/L (ref 3.4–4.9)
RBC # BLD: 5.03 M/UL (ref 4.2–5.4)
SODIUM BLD-SCNC: 137 MEQ/L (ref 135–144)
TOTAL PROTEIN: 7.4 G/DL (ref 6.3–8)
TRIGL SERPL-MCNC: 791 MG/DL (ref 0–150)
WBC # BLD: 6.6 K/UL (ref 4.8–10.8)

## 2022-12-05 PROCEDURE — 3046F HEMOGLOBIN A1C LEVEL >9.0%: CPT | Performed by: NURSE PRACTITIONER

## 2022-12-05 PROCEDURE — 99214 OFFICE O/P EST MOD 30 MIN: CPT | Performed by: NURSE PRACTITIONER

## 2022-12-05 PROCEDURE — 83036 HEMOGLOBIN GLYCOSYLATED A1C: CPT | Performed by: NURSE PRACTITIONER

## 2022-12-05 RX ORDER — GABAPENTIN 300 MG/1
300 CAPSULE ORAL 3 TIMES DAILY
Qty: 270 CAPSULE | Refills: 0
Start: 2022-12-05 | End: 2023-03-05

## 2022-12-05 RX ORDER — GLIPIZIDE 5 MG/1
5 TABLET ORAL 2 TIMES DAILY
Qty: 60 TABLET | Refills: 5 | Status: SHIPPED | OUTPATIENT
Start: 2022-12-05

## 2022-12-05 ASSESSMENT — ENCOUNTER SYMPTOMS
TROUBLE SWALLOWING: 0
COLOR CHANGE: 0
RECTAL PAIN: 0
SHORTNESS OF BREATH: 0
RESPIRATORY NEGATIVE: 1
ANAL BLEEDING: 0
FEELING OF CHOKING: 0
GASTROINTESTINAL NEGATIVE: 1
ALLERGIC/IMMUNOLOGIC NEGATIVE: 1
HYPERVENTILATION: 0
ABDOMINAL PAIN: 0
VOICE CHANGE: 0
BLURRED VISION: 0
EYES NEGATIVE: 1
CONSTIPATION: 0
NAUSEA: 0
VISUAL CHANGE: 0
BLOOD IN STOOL: 0
DIARRHEA: 0

## 2022-12-05 ASSESSMENT — PATIENT HEALTH QUESTIONNAIRE - PHQ9
SUM OF ALL RESPONSES TO PHQ QUESTIONS 1-9: 0
1. LITTLE INTEREST OR PLEASURE IN DOING THINGS: 0
SUM OF ALL RESPONSES TO PHQ QUESTIONS 1-9: 0
2. FEELING DOWN, DEPRESSED OR HOPELESS: 0
SUM OF ALL RESPONSES TO PHQ9 QUESTIONS 1 & 2: 0
SUM OF ALL RESPONSES TO PHQ QUESTIONS 1-9: 0
SUM OF ALL RESPONSES TO PHQ QUESTIONS 1-9: 0

## 2022-12-05 NOTE — PROGRESS NOTES
Subjective  Radha Hernandez, 56 y.o. female presents today with:  Chief Complaint   Patient presents with    Check-Up    Diabetes    Anxiety    Hyperlipidemia        Diabetes  She has type 2 diabetes mellitus. Her disease course has been worsening. Pertinent negatives for hypoglycemia include no confusion, dizziness, headaches or nervousness/anxiousness. Pertinent negatives for diabetes include no blurred vision, no chest pain, no fatigue, no foot paresthesias, no foot ulcerations, no polydipsia, no polyphagia, no polyuria, no visual change, no weakness and no weight loss. Symptoms are stable. There are no diabetic complications. Pertinent negatives for diabetic complications include no autonomic neuropathy, CVA, heart disease, impotence, nephropathy, peripheral neuropathy, PVD or retinopathy. Risk factors for coronary artery disease include diabetes mellitus. Current diabetic treatment includes oral agent (monotherapy). She is compliant with treatment some of the time. She is following a generally healthy diet. Meal planning includes avoidance of concentrated sweets. She participates in exercise intermittently. An ACE inhibitor/angiotensin II receptor blocker is being taken. She does not see a podiatrist.Eye exam is current. Anxiety  Presents for follow-up visit. Patient reports no chest pain, compulsions, confusion, decreased concentration, depressed mood, dizziness, dry mouth, excessive worry, feeling of choking, hyperventilation, impotence, insomnia, irritability, malaise, muscle tension, nausea, nervous/anxious behavior, obsessions, palpitations, panic, restlessness, shortness of breath or suicidal ideas. Symptoms occur occasionally. The severity of symptoms is mild. The quality of sleep is fair. Review of Systems   Constitutional: Negative. Negative for activity change, appetite change, fatigue, irritability, unexpected weight change and weight loss. HENT: Negative.   Negative for dental problem, nosebleeds, trouble swallowing and voice change. Eyes: Negative. Negative for blurred vision and visual disturbance. Respiratory: Negative. Negative for shortness of breath. Cardiovascular: Negative. Negative for chest pain, palpitations and leg swelling. Gastrointestinal: Negative. Negative for abdominal pain, anal bleeding, blood in stool, constipation, diarrhea, nausea and rectal pain. Endocrine: Negative. Negative for cold intolerance, heat intolerance, polydipsia, polyphagia and polyuria. Genitourinary: Negative. Negative for impotence. Musculoskeletal: Negative. Skin: Negative. Negative for color change and rash. Allergic/Immunologic: Negative. Neurological: Negative. Negative for dizziness, syncope, weakness and headaches. Hematological: Negative. Negative for adenopathy. Does not bruise/bleed easily. Psychiatric/Behavioral: Negative. Negative for confusion, decreased concentration, dysphoric mood, sleep disturbance and suicidal ideas. The patient is not nervous/anxious and does not have insomnia.       Past Medical History:   Diagnosis Date    Breast disorder     LEFT LUMP 1990 BENIGN    Depression     Diabetes mellitus ([facility] [address]     Menopausal symptoms     Overactive bladder     Stress incontinence      Past Surgical History:   Procedure Laterality Date    BLADDER SUSPENSION      TBT Mesh removal- 5/2020 Some in rectum    BREAST BIOPSY      BREAST SURGERY      HYSTERECTOMY (CERVIX STATUS UNKNOWN)      DONG/BSO    LEEP       Social History     Socioeconomic History    Marital status:      Spouse name: Not on file    Number of children: Not on file    Years of education: Not on file    Highest education level: Not on file   Occupational History    Not on file   Tobacco Use    Smoking status: Never    Smokeless tobacco: Never   Substance and Sexual Activity    Alcohol use: Yes    Drug use: Never    Sexual activity: Not on file   Other Topics Concern    Not on file Social History Narrative    Not on file     Social Determinants of Health     Financial Resource Strain: Low Risk     Difficulty of Paying Living Expenses: Not hard at all   Food Insecurity: No Food Insecurity    Worried About Running Out of Food in the Last Year: Never true    Ran Out of Food in the Last Year: Never true   Transportation Needs: Not on file   Physical Activity: Not on file   Stress: Not on file   Social Connections: Not on file   Intimate Partner Violence: Not on file   Housing Stability: Not on file     Family History   Problem Relation Age of Onset    Breast Cancer Mother      Allergies   Allergen Reactions    Nitrates, Organic      FOOD ALLERGY     Current Outpatient Medications   Medication Sig Dispense Refill    glipiZIDE (GLUCOTROL) 5 MG tablet Take 1 tablet by mouth 2 times daily 60 tablet 5    empagliflozin (JARDIANCE) 10 MG tablet Take 2.5 tablets by mouth daily 30 tablet 5    metFORMIN (GLUCOPHAGE) 1000 MG tablet Take 1 tablet by mouth 2 times daily (with meals) 60 tablet 5    gabapentin (NEURONTIN) 300 MG capsule Take 1 capsule by mouth 3 times daily for 90 days. 270 capsule 0    citalopram (CELEXA) 20 MG tablet TAKE ONE AND ONE-HALF TABLETS ONCE DAILY 135 tablet 3    traZODone (DESYREL) 50 MG tablet TAKE 2 TABLETS AT BEDTIME AS NEEDED FOR INSOMNIA 180 tablet 3     No current facility-administered medications for this visit. PMH, Surgical Hx, Family Hx, and Social Hx reviewed and updated. Health Maintenance reviewed. Objective    Vitals:    12/05/22 0850   BP: 130/74   Pulse: 78   Weight: 151 lb (68.5 kg)   Height: 5' 6\" (1.676 m)       Physical Exam  Constitutional:       General: She is not in acute distress. Appearance: Normal appearance. She is well-developed. She is not ill-appearing, toxic-appearing or diaphoretic. HENT:      Head: Normocephalic and atraumatic.       Right Ear: Tympanic membrane, ear canal and external ear normal.      Left Ear: Tympanic membrane, ear canal and external ear normal.      Nose: Nose normal.      Mouth/Throat:      Mouth: Mucous membranes are moist.   Eyes:      General: Lids are normal.      Extraocular Movements:      Right eye: No nystagmus. Left eye: No nystagmus. Conjunctiva/sclera: Conjunctivae normal.      Pupils: Pupils are equal, round, and reactive to light. Neck:      Thyroid: No thyroid mass or thyromegaly. Vascular: No carotid bruit or JVD. Trachea: Trachea normal. No tracheal deviation. Cardiovascular:      Rate and Rhythm: Normal rate and regular rhythm. Pulses: Normal pulses. Heart sounds: Normal heart sounds, S1 normal and S2 normal. No murmur heard. No gallop. Pulmonary:      Effort: Pulmonary effort is normal. No accessory muscle usage or respiratory distress. Breath sounds: Normal breath sounds. No decreased breath sounds, wheezing, rhonchi or rales. Abdominal:      General: Bowel sounds are normal. There is no distension. Palpations: Abdomen is soft. There is no hepatomegaly, splenomegaly or mass. Tenderness: There is no abdominal tenderness. Hernia: No hernia is present. Musculoskeletal:         General: Normal range of motion. Cervical back: Normal range of motion and neck supple. Lymphadenopathy:      Head:      Right side of head: No submandibular, tonsillar, preauricular or posterior auricular adenopathy. Left side of head: No submandibular, tonsillar, preauricular or posterior auricular adenopathy. Cervical: No cervical adenopathy. Skin:     General: Skin is warm and dry. Capillary Refill: Capillary refill takes less than 2 seconds. Coloration: Skin is not pale. Neurological:      General: No focal deficit present. Mental Status: She is alert and oriented to person, place, and time. GCS: GCS eye subscore is 4. GCS verbal subscore is 5. GCS motor subscore is 6.       Motor: No tremor, atrophy, abnormal muscle tone or seizure activity. Deep Tendon Reflexes: Reflexes are normal and symmetric. Psychiatric:         Mood and Affect: Mood normal.         Speech: Speech normal.         Behavior: Behavior normal. Behavior is cooperative. Assessment & Plan   Kimberlee Sims was seen today for check-up, diabetes, anxiety and hyperlipidemia. Diagnoses and all orders for this visit:    Type 2 diabetes mellitus without complication, without long-term current use of insulin ([facility] [address]  -     Lipid Panel; Future  -     Comprehensive Metabolic Panel; Future  -     CBC with Auto Differential; Future  -     Microalbumin / Creatinine Urine Ratio; Future  -     POCT glycosylated hemoglobin (Hb A1C)  -     glipiZIDE (GLUCOTROL) 5 MG tablet; Take 1 tablet by mouth 2 times daily  -     empagliflozin (JARDIANCE) 10 MG tablet; Take 2.5 tablets by mouth daily  -     metFORMIN (GLUCOPHAGE) 1000 MG tablet; Take 1 tablet by mouth 2 times daily (with meals)    Screening mammogram for breast cancer    Other orders  -     gabapentin (NEURONTIN) 300 MG capsule; Take 1 capsule by mouth 3 times daily for 90 days. Orders Placed This Encounter   Procedures    Lipid Panel     Standing Status:   Future     Number of Occurrences:   1     Standing Expiration Date:   12/5/2023     Order Specific Question:   Is Patient Fasting?/# of Hours     Answer:   0     Order Specific Question:   Has the patient fasted?      Answer:   No    Comprehensive Metabolic Panel     Standing Status:   Future     Number of Occurrences:   1     Standing Expiration Date:   12/5/2023    CBC with Auto Differential     Standing Status:   Future     Number of Occurrences:   1     Standing Expiration Date:   12/5/2023    Microalbumin / Creatinine Urine Ratio     Standing Status:   Future     Number of Occurrences:   1     Standing Expiration Date:   12/5/2023    POCT glycosylated hemoglobin (Hb A1C)     Orders Placed This Encounter   Medications    glipiZIDE (GLUCOTROL) 5 MG tablet     Sig: Take 1 tablet by mouth 2 times daily     Dispense:  60 tablet     Refill:  5    empagliflozin (JARDIANCE) 10 MG tablet     Sig: Take 2.5 tablets by mouth daily     Dispense:  30 tablet     Refill:  5    metFORMIN (GLUCOPHAGE) 1000 MG tablet     Sig: Take 1 tablet by mouth 2 times daily (with meals)     Dispense:  60 tablet     Refill:  5    gabapentin (NEURONTIN) 300 MG capsule     Sig: Take 1 capsule by mouth 3 times daily for 90 days. Dispense:  270 capsule     Refill:  0     Medications Discontinued During This Encounter   Medication Reason    fluconazole (DIFLUCAN) 150 MG tablet Therapy completed    canagliflozin (INVOKANA) 100 MG TABS tablet Therapy completed    clotrimazole-betamethasone (LOTRISONE) 1-0.05 % cream Therapy completed    estradiol (ESTRACE) 0.1 MG/GM vaginal cream Therapy completed    estradiol (ESTRACE) 1 MG tablet Therapy completed    estrogens, conjugated, (PREMARIN) 0.625 MG tablet Therapy completed    gabapentin (NEURONTIN) 300 MG capsule LIST CLEANUP    metFORMIN (GLUCOPHAGE) 1000 MG tablet LIST CLEANUP     Return in about 8 weeks (around 1/30/2023). Reviewed with the patient: current clinical status, medications, activities and diet. Side effects, adverse effects of the medication prescribed today, as well as treatment plan/ rationale and result expectations have been discussed with the patient who expresses understanding and desires to proceed. Close follow up to evaluate treatment results and for coordination of care. I have reviewed the patient's medical history in detail and updated the computerized patient record.     Jany Shah, APRN - CNP

## 2022-12-06 DIAGNOSIS — E66.9 DIABETES MELLITUS TYPE 2 IN OBESE (HCC): Primary | ICD-10-CM

## 2022-12-06 DIAGNOSIS — E78.2 MIXED HYPERLIPIDEMIA: Primary | ICD-10-CM

## 2022-12-06 DIAGNOSIS — E11.69 DIABETES MELLITUS TYPE 2 IN OBESE (HCC): Primary | ICD-10-CM

## 2022-12-06 RX ORDER — ROSUVASTATIN CALCIUM 5 MG/1
5 TABLET, COATED ORAL DAILY
Qty: 30 TABLET | Refills: 5 | Status: SHIPPED | OUTPATIENT
Start: 2022-12-06

## 2022-12-06 NOTE — RESULT ENCOUNTER NOTE
Please  Notify Juan Teixeira to let her cholesterol came back elevated it was extremely high so I sent a medication for her to start over to the pharmacy and I want her to come in and have her labs redrawn in 6 weeks

## 2022-12-08 ENCOUNTER — PATIENT MESSAGE (OUTPATIENT)
Dept: FAMILY MEDICINE CLINIC | Age: 57
End: 2022-12-08

## 2022-12-08 RX ORDER — LANCETS 30 GAUGE
EACH MISCELLANEOUS
Qty: 100 EACH | Refills: 3 | Status: SHIPPED | OUTPATIENT
Start: 2022-12-08

## 2022-12-08 RX ORDER — BLOOD SUGAR DIAGNOSTIC
STRIP MISCELLANEOUS
Qty: 100 EACH | Refills: 3 | Status: SHIPPED | OUTPATIENT
Start: 2022-12-08

## 2022-12-08 NOTE — TELEPHONE ENCOUNTER
From: Merline Ba  To: Britton Win  Sent: 12/8/2022 9:26 AM EST  Subject: Refill. Testing Strips and Lancets    Hello, I need a refill on lancets - one touch delica 78H LIF and the one touch version [facility]  [facility]

## 2023-01-04 DIAGNOSIS — E78.2 MIXED HYPERLIPIDEMIA: ICD-10-CM

## 2023-01-04 DIAGNOSIS — E11.69 DIABETES MELLITUS TYPE 2 IN OBESE (HCC): ICD-10-CM

## 2023-01-04 DIAGNOSIS — E66.9 DIABETES MELLITUS TYPE 2 IN OBESE (HCC): ICD-10-CM

## 2023-01-04 LAB
ALBUMIN SERPL-MCNC: 4.3 G/DL (ref 3.5–4.6)
ALP BLD-CCNC: 73 U/L (ref 40–130)
ALT SERPL-CCNC: 21 U/L (ref 0–33)
ANION GAP SERPL CALCULATED.3IONS-SCNC: 16 MEQ/L (ref 9–15)
AST SERPL-CCNC: 26 U/L (ref 0–35)
BILIRUB SERPL-MCNC: 0.7 MG/DL (ref 0.2–0.7)
BUN BLDV-MCNC: 11 MG/DL (ref 6–20)
CALCIUM SERPL-MCNC: 9.3 MG/DL (ref 8.5–9.9)
CHLORIDE BLD-SCNC: 101 MEQ/L (ref 95–107)
CHOLESTEROL, TOTAL: 123 MG/DL (ref 0–199)
CO2: 24 MEQ/L (ref 20–31)
CREAT SERPL-MCNC: 0.66 MG/DL (ref 0.5–0.9)
GFR SERPL CREATININE-BSD FRML MDRD: >60 ML/MIN/{1.73_M2}
GLOBULIN: 2.8 G/DL (ref 2.3–3.5)
GLUCOSE BLD-MCNC: 106 MG/DL (ref 70–99)
HBA1C MFR BLD: 8.5 % (ref 4.8–5.9)
HDLC SERPL-MCNC: 34 MG/DL (ref 40–59)
LDL CHOLESTEROL CALCULATED: 55 MG/DL (ref 0–129)
POTASSIUM SERPL-SCNC: 4.3 MEQ/L (ref 3.4–4.9)
SODIUM BLD-SCNC: 141 MEQ/L (ref 135–144)
TOTAL PROTEIN: 7.1 G/DL (ref 6.3–8)
TRIGL SERPL-MCNC: 168 MG/DL (ref 0–150)

## 2023-01-10 RX ORDER — TIRZEPATIDE 2.5 MG/.5ML
2.5 INJECTION, SOLUTION SUBCUTANEOUS WEEKLY
Qty: 4 ADJUSTABLE DOSE PRE-FILLED PEN SYRINGE | Refills: 1 | Status: SHIPPED | OUTPATIENT
Start: 2023-01-10

## 2023-01-10 NOTE — RESULT ENCOUNTER NOTE
Please notifying Kev Bell her HGA1C came down to 8.5 good job! Next goal is to get it to 7.0.   Remainder of labs were fine

## 2023-01-19 DIAGNOSIS — E66.9 DIABETES MELLITUS TYPE 2 IN OBESE (HCC): Primary | ICD-10-CM

## 2023-01-19 DIAGNOSIS — E11.69 DIABETES MELLITUS TYPE 2 IN OBESE (HCC): Primary | ICD-10-CM

## 2023-01-21 DIAGNOSIS — E66.9 DIABETES MELLITUS TYPE 2 IN OBESE (HCC): ICD-10-CM

## 2023-01-21 DIAGNOSIS — E11.69 DIABETES MELLITUS TYPE 2 IN OBESE (HCC): ICD-10-CM

## 2023-01-21 LAB — HBA1C MFR BLD: 7.5 % (ref 4.8–5.9)

## 2023-01-24 ENCOUNTER — OFFICE VISIT (OUTPATIENT)
Dept: FAMILY MEDICINE CLINIC | Age: 58
End: 2023-01-24
Payer: COMMERCIAL

## 2023-01-24 VITALS
SYSTOLIC BLOOD PRESSURE: 118 MMHG | BODY MASS INDEX: 23.24 KG/M2 | HEART RATE: 88 BPM | HEIGHT: 66 IN | TEMPERATURE: 98.3 F | DIASTOLIC BLOOD PRESSURE: 83 MMHG | WEIGHT: 144.6 LBS | OXYGEN SATURATION: 97 %

## 2023-01-24 DIAGNOSIS — E11.65 TYPE 2 DIABETES MELLITUS WITH HYPERGLYCEMIA, WITHOUT LONG-TERM CURRENT USE OF INSULIN (HCC): Primary | ICD-10-CM

## 2023-01-24 PROCEDURE — 3051F HG A1C>EQUAL 7.0%<8.0%: CPT | Performed by: NURSE PRACTITIONER

## 2023-01-24 PROCEDURE — 99213 OFFICE O/P EST LOW 20 MIN: CPT | Performed by: NURSE PRACTITIONER

## 2023-01-24 RX ORDER — TIRZEPATIDE 2.5 MG/.5ML
2.5 INJECTION, SOLUTION SUBCUTANEOUS WEEKLY
Qty: 4 ADJUSTABLE DOSE PRE-FILLED PEN SYRINGE | Refills: 2 | Status: SHIPPED | OUTPATIENT
Start: 2023-01-24

## 2023-01-24 RX ORDER — ESTRADIOL 1 MG/1
1 TABLET ORAL DAILY
COMMUNITY

## 2023-01-24 ASSESSMENT — PATIENT HEALTH QUESTIONNAIRE - PHQ9
SUM OF ALL RESPONSES TO PHQ QUESTIONS 1-9: 0
1. LITTLE INTEREST OR PLEASURE IN DOING THINGS: 0
SUM OF ALL RESPONSES TO PHQ QUESTIONS 1-9: 0
SUM OF ALL RESPONSES TO PHQ9 QUESTIONS 1 & 2: 0
SUM OF ALL RESPONSES TO PHQ QUESTIONS 1-9: 0
SUM OF ALL RESPONSES TO PHQ QUESTIONS 1-9: 0
2. FEELING DOWN, DEPRESSED OR HOPELESS: 0

## 2023-01-24 NOTE — PROGRESS NOTES
Subjective:      Patient ID: Loraine Maradiaga is a 56 y.o. female who presents today for:     Chief Complaint   Patient presents with    Diabetes     Patient presents today to discuss A1C level for  sacrospinous fixation surgery on 02/03/23. HPI Pt in today to discuss diabetes. Reports that she will be having sacrospinous surgery on 2/3/23. She has been taking all medications. A1c is down to 7.5. She denies any hypo or hyperglycemic episodes. She reports has been tolerating medications well.     Past Medical History:   Diagnosis Date    Breast disorder     LEFT LUMP 1990 BENIGN    Depression     Diabetes mellitus (HCC)     Menopausal symptoms     Overactive bladder     Stress incontinence      Past Surgical History:   Procedure Laterality Date    BLADDER SUSPENSION      TBT Mesh removal- 5/2020 Some in rectum    BREAST BIOPSY      BREAST SURGERY      HYSTERECTOMY (CERVIX STATUS UNKNOWN)      DONG/BSO    LEEP       Family History   Problem Relation Age of Onset    Breast Cancer Mother      Social History     Socioeconomic History    Marital status:      Spouse name: Not on file    Number of children: Not on file    Years of education: Not on file    Highest education level: Not on file   Occupational History    Not on file   Tobacco Use    Smoking status: Never    Smokeless tobacco: Never   Substance and Sexual Activity    Alcohol use: Yes    Drug use: Never    Sexual activity: Not on file   Other Topics Concern    Not on file   Social History Narrative    Not on file     Social Determinants of Health     Financial Resource Strain: Low Risk     Difficulty of Paying Living Expenses: Not hard at all   Food Insecurity: No Food Insecurity    Worried About Running Out of Food in the Last Year: Never true    Ran Out of Food in the Last Year: Never true   Transportation Needs: Not on file   Physical Activity: Not on file   Stress: Not on file   Social Connections: Not on file   Intimate Partner Violence: Not on file   Housing Stability: Not on file     Current Outpatient Medications on File Prior to Visit   Medication Sig Dispense Refill    estradiol (ESTRACE) 1 MG tablet Take 1 mg by mouth daily      OneTouch Delica Lancets 76G MISC Checks 1 time daily. NIDDM--ICD-10 E11.9 100 each 3    blood glucose test strips (ONETOUCH VERIO) strip Checks 1 time daily. NIDDM--ICD-10 E11.9 100 each 3    rosuvastatin (CRESTOR) 5 MG tablet Take 1 tablet by mouth daily 30 tablet 5    glipiZIDE (GLUCOTROL) 5 MG tablet Take 1 tablet by mouth 2 times daily 60 tablet 5    empagliflozin (JARDIANCE) 10 MG tablet Take 2.5 tablets by mouth daily 30 tablet 5    metFORMIN (GLUCOPHAGE) 1000 MG tablet Take 1 tablet by mouth 2 times daily (with meals) 60 tablet 5    gabapentin (NEURONTIN) 300 MG capsule Take 1 capsule by mouth 3 times daily for 90 days. 270 capsule 0    citalopram (CELEXA) 20 MG tablet TAKE ONE AND ONE-HALF TABLETS ONCE DAILY 135 tablet 3    traZODone (DESYREL) 50 MG tablet TAKE 2 TABLETS AT BEDTIME AS NEEDED FOR INSOMNIA 180 tablet 3     No current facility-administered medications on file prior to visit. Allergies:  Nitrates, organic    Review of Systems   Constitutional:  Negative for chills, fatigue and fever. HENT:  Negative for congestion. Respiratory:  Negative for cough, shortness of breath and wheezing. Cardiovascular:  Negative for chest pain and palpitations. Endocrine: Negative for polydipsia, polyphagia and polyuria. Neurological:  Negative for dizziness and headaches. Objective:   /83 (Site: Right Upper Arm, Position: Sitting, Cuff Size: Medium Adult)   Pulse 88   Temp 98.3 °F (36.8 °C) (Temporal)   Ht 5' 6\" (1.676 m)   Wt 144 lb 9.6 oz (65.6 kg)   SpO2 97%   BMI 23.34 kg/m²     Physical Exam  Constitutional:       Appearance: She is well-developed. HENT:      Head: Normocephalic.       Right Ear: Tympanic membrane, ear canal and external ear normal.      Left Ear: Tympanic membrane, ear canal and external ear normal.      Nose: Nose normal.      Mouth/Throat:      Mouth: Mucous membranes are moist.      Pharynx: Oropharynx is clear. Uvula midline. Eyes:      General:         Right eye: No discharge. Left eye: No discharge. Conjunctiva/sclera: Conjunctivae normal.   Cardiovascular:      Rate and Rhythm: Normal rate and regular rhythm. Heart sounds: Normal heart sounds. Pulmonary:      Effort: Pulmonary effort is normal. No respiratory distress. Breath sounds: Normal breath sounds. Musculoskeletal:      Cervical back: Normal range of motion. Lymphadenopathy:      Cervical: No cervical adenopathy. Skin:     General: Skin is warm and dry. Neurological:      Mental Status: She is alert and oriented to person, place, and time. Psychiatric:         Mood and Affect: Mood normal.         Behavior: Behavior normal.       Assessment:          Diagnosis Orders   1. Type 2 diabetes mellitus with hyperglycemia, without long-term current use of insulin (HCC)  Tirzepatide (MOUNJARO) 2.5 MG/0.5ML SOPN SC injection          Plan:      No orders of the defined types were placed in this encounter. Orders Placed This Encounter   Medications    Tirzepatide (MOUNJARO) 2.5 MG/0.5ML SOPN SC injection     Sig: Inject 0.5 mLs into the skin once a week     Dispense:  4 Adjustable Dose Pre-filled Pen Syringe     Refill:  2       Return for as scheduled. 1. Type 2 diabetes mellitus with hyperglycemia, without long-term current use of insulin (HCC)  Chronic, improving. Significant improvement. Continue glipizide 5 mg twice daily, Jardiance 10 mg daily, metformin 2000 mg twice daily, and Mounjaro 2.5 mg weekly. Diabetic diet. - Tirzepatide (MOUNJARO) 2.5 MG/0.5ML SOPN SC injection; Inject 0.5 mLs into the skin once a week  Dispense: 4 Adjustable Dose Pre-filled Pen Syringe; Refill: 2    Reviewed with the patient: current clinicalstatus, medications, activities and diet.      Side effects, adverse effects of the medication prescribedtoday, as well as treatment plan/ rationale and result expectations have been discussedwith the patient who expresses understanding and desires to proceed. Close follow upto evaluate treatment results and for coordination of care. I have reviewedthe patient's medical history in detail and updated the computerized patient record.     Karishma Flores, APRN - CNP

## 2023-01-27 ASSESSMENT — ENCOUNTER SYMPTOMS
WHEEZING: 0
COUGH: 0
SHORTNESS OF BREATH: 0

## 2023-02-24 ENCOUNTER — HOSPITAL ENCOUNTER (OUTPATIENT)
Dept: WOMENS IMAGING | Age: 58
Discharge: HOME | End: 2023-02-24
Payer: COMMERCIAL

## 2023-02-24 DIAGNOSIS — Z12.31 SCREENING MAMMOGRAM, ENCOUNTER FOR: ICD-10-CM

## 2023-02-24 PROCEDURE — 77067 SCR MAMMO BI INCL CAD: CPT

## 2023-03-03 RX ORDER — TRAZODONE HYDROCHLORIDE 50 MG/1
TABLET ORAL
Qty: 180 TABLET | Refills: 0 | Status: SHIPPED | OUTPATIENT
Start: 2023-03-03

## 2023-04-12 ENCOUNTER — HOSPITAL ENCOUNTER (EMERGENCY)
Age: 58
Discharge: HOME | End: 2023-04-12
Attending: EMERGENCY MEDICINE
Payer: COMMERCIAL

## 2023-04-12 ENCOUNTER — APPOINTMENT (OUTPATIENT)
Dept: CT IMAGING | Age: 58
End: 2023-04-12
Payer: COMMERCIAL

## 2023-04-12 ENCOUNTER — APPOINTMENT (OUTPATIENT)
Dept: GENERAL RADIOLOGY | Age: 58
End: 2023-04-12
Payer: COMMERCIAL

## 2023-04-12 VITALS
HEART RATE: 87 BPM | TEMPERATURE: 98.5 F | DIASTOLIC BLOOD PRESSURE: 96 MMHG | WEIGHT: 135 LBS | HEIGHT: 66 IN | BODY MASS INDEX: 21.69 KG/M2 | OXYGEN SATURATION: 95 % | RESPIRATION RATE: 18 BRPM | SYSTOLIC BLOOD PRESSURE: 144 MMHG
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
## 2023-04-12 DIAGNOSIS — S16.1XXA STRAIN OF NECK MUSCLE, INITIAL ENCOUNTER: ICD-10-CM

## 2023-04-12 PROCEDURE — 70450 CT HEAD/BRAIN W/O DYE: CPT

## 2023-04-12 PROCEDURE — 99285 EMERGENCY DEPT VISIT HI MDM: CPT

## 2023-04-12 PROCEDURE — 6370000000 HC RX 637 (ALT 250 FOR IP): Performed by: EMERGENCY MEDICINE

## 2023-04-12 PROCEDURE — 96372 THER/PROPH/DIAG INJ SC/IM: CPT

## 2023-04-12 PROCEDURE — 73030 X-RAY EXAM OF SHOULDER: CPT

## 2023-04-12 PROCEDURE — 6360000002 HC RX W HCPCS: Performed by: EMERGENCY MEDICINE

## 2023-04-12 PROCEDURE — 72125 CT NECK SPINE W/O DYE: CPT

## 2023-04-12 RX ORDER — OXYCODONE HYDROCHLORIDE AND ACETAMINOPHEN 5; 325 MG/1; MG/1
1 TABLET ORAL ONCE
Status: COMPLETED | OUTPATIENT
Start: 2023-04-12 | End: 2023-04-12

## 2023-04-12 RX ORDER — KETOROLAC TROMETHAMINE 10 MG/1
10 TABLET, FILM COATED ORAL EVERY 6 HOURS PRN
Qty: 20 TABLET | Refills: 0 | Status: SHIPPED | OUTPATIENT
Start: 2023-04-12

## 2023-04-12 RX ORDER — ONDANSETRON 4 MG/1
4 TABLET, ORALLY DISINTEGRATING ORAL ONCE
Status: COMPLETED | OUTPATIENT
Start: 2023-04-12 | End: 2023-04-12

## 2023-04-12 RX ORDER — KETOROLAC TROMETHAMINE 30 MG/ML
30 INJECTION, SOLUTION INTRAMUSCULAR; INTRAVENOUS ONCE
Status: COMPLETED | OUTPATIENT
Start: 2023-04-12 | End: 2023-04-12

## 2023-04-12 RX ORDER — LIDOCAINE 50 MG/G
1 PATCH TOPICAL DAILY
Qty: 30 PATCH | Refills: 0 | Status: SHIPPED | OUTPATIENT
Start: 2023-04-12

## 2023-04-12 RX ADMIN — OXYCODONE AND ACETAMINOPHEN 1 TABLET: 5; 325 TABLET ORAL at 19:40

## 2023-04-12 RX ADMIN — KETOROLAC TROMETHAMINE 30 MG: 30 INJECTION, SOLUTION INTRAMUSCULAR; INTRAVENOUS at 21:25

## 2023-04-12 RX ADMIN — ONDANSETRON 4 MG: 4 TABLET, ORALLY DISINTEGRATING ORAL at 21:25

## 2023-04-12 ASSESSMENT — PAIN DESCRIPTION - DESCRIPTORS: DESCRIPTORS: BURNING

## 2023-04-12 ASSESSMENT — LIFESTYLE VARIABLES
HOW MANY STANDARD DRINKS CONTAINING ALCOHOL DO YOU HAVE ON A TYPICAL DAY: PATIENT DOES NOT DRINK
HOW OFTEN DO YOU HAVE A DRINK CONTAINING ALCOHOL: NEVER

## 2023-04-12 ASSESSMENT — ENCOUNTER SYMPTOMS
PHOTOPHOBIA: 0
ABDOMINAL PAIN: 0
VOMITING: 0

## 2023-04-12 ASSESSMENT — PAIN DESCRIPTION - FREQUENCY: FREQUENCY: CONTINUOUS

## 2023-04-12 ASSESSMENT — PAIN SCALES - GENERAL
PAINLEVEL_OUTOF10: 8

## 2023-04-12 ASSESSMENT — PAIN DESCRIPTION - LOCATION: LOCATION: SHOULDER;NECK

## 2023-04-12 ASSESSMENT — PAIN - FUNCTIONAL ASSESSMENT: PAIN_FUNCTIONAL_ASSESSMENT: 0-10

## 2023-04-12 ASSESSMENT — PAIN DESCRIPTION - PAIN TYPE: TYPE: ACUTE PAIN
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
## 2023-05-09 DIAGNOSIS — E11.65 TYPE 2 DIABETES MELLITUS WITH HYPERGLYCEMIA, WITHOUT LONG-TERM CURRENT USE OF INSULIN (HCC): ICD-10-CM

## 2023-05-09 RX ORDER — TIRZEPATIDE 2.5 MG/.5ML
2.5 INJECTION, SOLUTION SUBCUTANEOUS WEEKLY
Qty: 4 ADJUSTABLE DOSE PRE-FILLED PEN SYRINGE | Refills: 2 | Status: SHIPPED | OUTPATIENT
Start: 2023-05-09

## 2023-05-09 NOTE — TELEPHONE ENCOUNTER
Milo Dangelo is requesting medication refill. Patient has confirmed the pharmacy. Rx requested:  Requested Prescriptions     Pending Prescriptions Disp Refills    Tirzepatide (MOUNJARO) 2.5 MG/0.5ML SOPN SC injection 4 Adjustable Dose Pre-filled Pen Syringe 2     Sig: Inject 0.5 mLs into the skin once a week       Last Office Visit:   12/5/2022    Last Tox screen:    ?     Last Medication contract:    ?    Next Visit Date:  Future Appointments   Date Time Provider Justina Glaser   5/19/2023  8:30 AM Julienne Pike DO [address]   6/12/2023  2:30 PM Manisha Shaw, [address]

## 2023-05-30 ENCOUNTER — TELEPHONE (OUTPATIENT)
Dept: FAMILY MEDICINE CLINIC | Age: 58
End: 2023-05-30

## 2023-05-30 DIAGNOSIS — E11.65 TYPE 2 DIABETES MELLITUS WITH HYPERGLYCEMIA, WITHOUT LONG-TERM CURRENT USE OF INSULIN (HCC): Primary | ICD-10-CM

## 2023-05-30 RX ORDER — TRAZODONE HYDROCHLORIDE 50 MG/1
TABLET ORAL
Qty: 60 TABLET | Refills: 0 | Status: SHIPPED | OUTPATIENT
Start: 2023-05-30

## 2023-06-12 ENCOUNTER — OFFICE VISIT (OUTPATIENT)
Dept: FAMILY MEDICINE CLINIC | Age: 58
End: 2023-06-12
Payer: COMMERCIAL

## 2023-06-12 VITALS
BODY MASS INDEX: 24.75 KG/M2 | HEART RATE: 80 BPM | DIASTOLIC BLOOD PRESSURE: 82 MMHG | SYSTOLIC BLOOD PRESSURE: 120 MMHG | HEIGHT: 66 IN | WEIGHT: 154 LBS

## 2023-06-12 DIAGNOSIS — E78.2 MIXED HYPERLIPIDEMIA: ICD-10-CM

## 2023-06-12 DIAGNOSIS — E11.65 TYPE 2 DIABETES MELLITUS WITH HYPERGLYCEMIA, WITHOUT LONG-TERM CURRENT USE OF INSULIN (HCC): ICD-10-CM

## 2023-06-12 DIAGNOSIS — E11.9 TYPE 2 DIABETES MELLITUS WITHOUT COMPLICATION, WITHOUT LONG-TERM CURRENT USE OF INSULIN (HCC): ICD-10-CM

## 2023-06-12 LAB — HBA1C MFR BLD: 8.1 % (ref 4.8–5.9)

## 2023-06-12 PROCEDURE — 3052F HG A1C>EQUAL 8.0%<EQUAL 9.0%: CPT | Performed by: NURSE PRACTITIONER

## 2023-06-12 PROCEDURE — 99214 OFFICE O/P EST MOD 30 MIN: CPT | Performed by: NURSE PRACTITIONER

## 2023-06-12 RX ORDER — CITALOPRAM 40 MG/1
40 TABLET ORAL DAILY
Qty: 90 TABLET | Refills: 3 | Status: SHIPPED | OUTPATIENT
Start: 2023-06-12

## 2023-06-12 RX ORDER — GLIPIZIDE 5 MG/1
5 TABLET ORAL 2 TIMES DAILY
Qty: 180 TABLET | Refills: 3 | Status: SHIPPED | OUTPATIENT
Start: 2023-06-12

## 2023-06-12 RX ORDER — ROSUVASTATIN CALCIUM 5 MG/1
5 TABLET, COATED ORAL DAILY
Qty: 90 TABLET | Refills: 3 | Status: SHIPPED | OUTPATIENT
Start: 2023-06-12

## 2023-06-12 SDOH — ECONOMIC STABILITY: INCOME INSECURITY: HOW HARD IS IT FOR YOU TO PAY FOR THE VERY BASICS LIKE FOOD, HOUSING, MEDICAL CARE, AND HEATING?: NOT HARD AT ALL

## 2023-06-12 SDOH — ECONOMIC STABILITY: FOOD INSECURITY: WITHIN THE PAST 12 MONTHS, THE FOOD YOU BOUGHT JUST DIDN'T LAST AND YOU DIDN'T HAVE MONEY TO GET MORE.: NEVER TRUE

## 2023-06-12 SDOH — ECONOMIC STABILITY: HOUSING INSECURITY
IN THE LAST 12 MONTHS, WAS THERE A TIME WHEN YOU DID NOT HAVE A STEADY PLACE TO SLEEP OR SLEPT IN A SHELTER (INCLUDING NOW)?: NO

## 2023-06-12 SDOH — ECONOMIC STABILITY: FOOD INSECURITY: WITHIN THE PAST 12 MONTHS, YOU WORRIED THAT YOUR FOOD WOULD RUN OUT BEFORE YOU GOT MONEY TO BUY MORE.: NEVER TRUE

## 2023-06-13 NOTE — RESULT ENCOUNTER NOTE
Please  Notify Emery Melo to let her know her HGA1C was up to 8.1.   I know she was not taking her meds, so please please resume her medications and we will recheck this at her next appointment

## 2023-06-15 DIAGNOSIS — Z11.51 ENCOUNTER FOR SCREENING FOR HUMAN PAPILLOMAVIRUS (HPV): ICD-10-CM

## 2023-06-15 DIAGNOSIS — Z01.419 PAP SMEAR, AS PART OF ROUTINE GYNECOLOGICAL EXAMINATION: ICD-10-CM

## 2023-06-19 ASSESSMENT — ENCOUNTER SYMPTOMS
VOICE CHANGE: 0
RECTAL PAIN: 0
ABDOMINAL PAIN: 0
HYPERVENTILATION: 0
SHORTNESS OF BREATH: 0
ANAL BLEEDING: 0
DIARRHEA: 0
VISUAL CHANGE: 0
EYES NEGATIVE: 1
RESPIRATORY NEGATIVE: 1
CONSTIPATION: 0
BLOOD IN STOOL: 0
GASTROINTESTINAL NEGATIVE: 1
TROUBLE SWALLOWING: 0
FEELING OF CHOKING: 0
BLURRED VISION: 0
NAUSEA: 0
ALLERGIC/IMMUNOLOGIC NEGATIVE: 1
COLOR CHANGE: 0

## 2023-06-20 NOTE — PROGRESS NOTES
Years of education: Not on file    Highest education level: Not on file   Occupational History    Not on file   Tobacco Use    Smoking status: Never    Smokeless tobacco: Never   Substance and Sexual Activity    Alcohol use: Yes    Drug use: Never    Sexual activity: Not on file   Other Topics Concern    Not on file   Social History Narrative    Not on file     Social Determinants of Health     Financial Resource Strain: Low Risk     Difficulty of Paying Living Expenses: Not hard at all   Food Insecurity: No Food Insecurity    Worried About Running Out of Food in the Last Year: Never true    [address] in the Last Year: Never true   Transportation Needs: Unknown    Lack of Transportation (Medical): Not on file    Lack of Transportation (Non-Medical):  No   Physical Activity: Not on file   Stress: Not on file   Social Connections: Not on file   Intimate Partner Violence: Not on file   Housing Stability: Unknown    Unable to Pay for Housing in the Last Year: Not on file    Number of Places Lived in the Last Year: Not on file    Unstable Housing in the Last Year: No     Family History   Problem Relation Age of Onset    Breast Cancer Mother      Allergies   Allergen Reactions    Nitrates, Organic      FOOD ALLERGY     Current Outpatient Medications   Medication Sig Dispense Refill    empagliflozin (JARDIANCE) 25 MG tablet Take 1 tablet by mouth daily 90 tablet 3    glipiZIDE (GLUCOTROL) 5 MG tablet Take 1 tablet by mouth 2 times daily 180 tablet 3    rosuvastatin (CRESTOR) 5 MG tablet Take 1 tablet by mouth daily 90 tablet 3    citalopram (CELEXA) 40 MG tablet Take 1 tablet by mouth daily 90 tablet 3    traZODone (DESYREL) 50 MG tablet TAKE 2 TABLETS AT BEDTIME AS NEEDED FOR INSOMNIA 60 tablet 0    Tirzepatide (MOUNJARO) 2.5 MG/0.5ML SOPN SC injection Inject 0.5 mLs into the skin once a week 4 Adjustable Dose Pre-filled Pen Syringe 2    lidocaine (LIDODERM) 5 % Place 1 patch onto the skin

## 2023-06-23 ENCOUNTER — TELEPHONE (OUTPATIENT)
Dept: FAMILY MEDICINE CLINIC | Age: 58
End: 2023-06-23

## 2023-06-23 NOTE — TELEPHONE ENCOUNTER
Patient calling saying that she needs a PA for Jardiance    Asking if this can be done asap she leaves [place] in morning     Thank you

## 2023-06-24 LAB
HPV HR 12 DNA SPEC QL NAA+PROBE: NOT DETECTED
HPV16 DNA SPEC QL NAA+PROBE: NOT DETECTED
HPV16+18+H RISK 12 DNA SPEC-IMP: NORMAL
HPV18 DNA SPEC QL NAA+PROBE: NOT DETECTED

## 2023-06-26 NOTE — TELEPHONE ENCOUNTER
LMOMCB. Minda Raymundo tried to start PA for pt, but it keep saying pt not found. I wanted to see if pt has a separate rx card from her medical insurance card. If so, I will need the information on it.    RXBIN#, RXgroup#, and RxPCN#

## 2023-07-06 RX ORDER — CITALOPRAM 20 MG/1
TABLET ORAL
Qty: 135 TABLET | Refills: 3 | Status: SHIPPED | OUTPATIENT
Start: 2023-07-06

## 2023-07-06 NOTE — TELEPHONE ENCOUNTER
Rx requested:  Requested Prescriptions     Pending Prescriptions Disp Refills    citalopram (CELEXA) 20 MG tablet [Pharmacy Med Name: CITALOPRAM HYDROBROMIDE TABS 20MG] 135 tablet 3     Sig: TAKE ONE AND ONE-HALF TABLETS ONCE DAILY         Last Office Visit:   06/12/2023      Next Visit Date:  Future Appointments   Date Time Provider [address]  [facility]   7/25/2023  4:45 PM Katie Roach, APRN - CNP [facility] [address]   6/20/2024  8:30 AM DO MELANIE Tena [address]

## 2023-07-25 ENCOUNTER — OFFICE VISIT (OUTPATIENT)
Dept: FAMILY MEDICINE CLINIC | Age: 58
End: 2023-07-25
Payer: COMMERCIAL

## 2023-07-25 VITALS
DIASTOLIC BLOOD PRESSURE: 70 MMHG | HEART RATE: 74 BPM | SYSTOLIC BLOOD PRESSURE: 124 MMHG | HEIGHT: 66 IN | BODY MASS INDEX: 25.23 KG/M2 | WEIGHT: 157 LBS

## 2023-07-25 DIAGNOSIS — F41.9 ANXIETY: ICD-10-CM

## 2023-07-25 DIAGNOSIS — E11.9 TYPE 2 DIABETES MELLITUS WITHOUT COMPLICATION, WITHOUT LONG-TERM CURRENT USE OF INSULIN (HCC): Primary | ICD-10-CM

## 2023-07-25 PROCEDURE — 99214 OFFICE O/P EST MOD 30 MIN: CPT | Performed by: NURSE PRACTITIONER

## 2023-07-25 PROCEDURE — 3052F HG A1C>EQUAL 8.0%<EQUAL 9.0%: CPT | Performed by: NURSE PRACTITIONER

## 2023-07-25 RX ORDER — PAROXETINE HYDROCHLORIDE 20 MG/1
20 TABLET, FILM COATED ORAL EVERY MORNING
Qty: 30 TABLET | Refills: 5 | Status: SHIPPED | OUTPATIENT
Start: 2023-07-25

## 2023-07-25 RX ORDER — GABAPENTIN 300 MG/1
300 CAPSULE ORAL 3 TIMES DAILY
Qty: 270 CAPSULE | Refills: 0 | Status: SHIPPED | OUTPATIENT
Start: 2023-07-25 | End: 2023-10-23

## 2023-07-25 ASSESSMENT — ENCOUNTER SYMPTOMS
VOICE CHANGE: 0
ANAL BLEEDING: 0
ALLERGIC/IMMUNOLOGIC NEGATIVE: 1
CONSTIPATION: 0
GASTROINTESTINAL NEGATIVE: 1
NAUSEA: 0
EYES NEGATIVE: 1
VISUAL CHANGE: 0
DIARRHEA: 0
BLURRED VISION: 0
FEELING OF CHOKING: 0
RESPIRATORY NEGATIVE: 1
COLOR CHANGE: 0
SHORTNESS OF BREATH: 0
TROUBLE SWALLOWING: 0
RECTAL PAIN: 0
BLOOD IN STOOL: 0
ABDOMINAL PAIN: 0
HYPERVENTILATION: 0

## 2023-07-25 NOTE — PROGRESS NOTES
Discontinue celexa     Medications Discontinued During This Encounter   Medication Reason    citalopram (CELEXA) 20 MG tablet Therapy completed    gabapentin (NEURONTIN) 300 MG capsule REORDER    citalopram (CELEXA) 40 MG tablet LIST CLEANUP    Tirzepatide (MOUNJARO) 2.5 MG/0.5ML SOPN SC injection      Return for 8-12 weeks. Reviewed with the patient: current clinical status, medications, activities and diet. Side effects, adverse effects of the medication prescribed today, as well as treatment plan/ rationale and result expectations have been discussed with the patient who expresses understanding and desires to proceed. Close follow up to evaluate treatment results and for coordination of care. I have reviewed the patient's medical history in detail and updated the computerized patient record.     Ameya Mail, APRN - CNP

## 2023-07-27 RX ORDER — ESTRADIOL 1 MG/1
TABLET ORAL
Qty: 90 TABLET | Refills: 3 | Status: SHIPPED | OUTPATIENT
Start: 2023-07-27

## 2023-09-22 ENCOUNTER — OFFICE VISIT (OUTPATIENT)
Dept: FAMILY MEDICINE CLINIC | Age: 58
End: 2023-09-22
Payer: COMMERCIAL

## 2023-09-22 VITALS
HEIGHT: 66 IN | WEIGHT: 149 LBS | DIASTOLIC BLOOD PRESSURE: 62 MMHG | OXYGEN SATURATION: 97 % | SYSTOLIC BLOOD PRESSURE: 104 MMHG | HEART RATE: 84 BPM | BODY MASS INDEX: 23.95 KG/M2 | TEMPERATURE: 97.6 F

## 2023-09-22 DIAGNOSIS — J40 BRONCHITIS: ICD-10-CM

## 2023-09-22 DIAGNOSIS — E11.65 TYPE 2 DIABETES MELLITUS WITH HYPERGLYCEMIA, WITHOUT LONG-TERM CURRENT USE OF INSULIN (HCC): ICD-10-CM

## 2023-09-22 DIAGNOSIS — J01.40 ACUTE NON-RECURRENT PANSINUSITIS: Primary | ICD-10-CM

## 2023-09-22 DIAGNOSIS — J06.9 URI WITH COUGH AND CONGESTION: ICD-10-CM

## 2023-09-22 PROCEDURE — 3052F HG A1C>EQUAL 8.0%<EQUAL 9.0%: CPT | Performed by: NURSE PRACTITIONER

## 2023-09-22 PROCEDURE — 99214 OFFICE O/P EST MOD 30 MIN: CPT | Performed by: NURSE PRACTITIONER

## 2023-09-22 RX ORDER — DEXTROMETHORPHAN HYDROBROMIDE AND PROMETHAZINE HYDROCHLORIDE 15; 6.25 MG/5ML; MG/5ML
5 SYRUP ORAL 4 TIMES DAILY PRN
Qty: 118 ML | Refills: 0 | Status: SHIPPED | OUTPATIENT
Start: 2023-09-22

## 2023-09-22 RX ORDER — METHYLPREDNISOLONE 4 MG/1
TABLET ORAL
Qty: 1 KIT | Refills: 0 | Status: SHIPPED | OUTPATIENT
Start: 2023-09-22 | End: 2023-09-28

## 2023-09-22 RX ORDER — TIRZEPATIDE 2.5 MG/.5ML
2.5 INJECTION, SOLUTION SUBCUTANEOUS WEEKLY
COMMUNITY
Start: 2023-08-30

## 2023-09-22 RX ORDER — AMOXICILLIN AND CLAVULANATE POTASSIUM 875; 125 MG/1; MG/1
1 TABLET, FILM COATED ORAL 2 TIMES DAILY
Qty: 20 TABLET | Refills: 0 | Status: SHIPPED | OUTPATIENT
Start: 2023-09-22 | End: 2023-10-02

## 2023-09-22 ASSESSMENT — ENCOUNTER SYMPTOMS
COUGH: 1
NAUSEA: 0
DIARRHEA: 0
SINUS PAIN: 1
VOMITING: 0
SORE THROAT: 1
SHORTNESS OF BREATH: 0
SINUS PRESSURE: 1
RHINORRHEA: 1
WHEEZING: 1

## 2023-09-22 NOTE — PROGRESS NOTES
Subjective:      Patient ID: Camila Hudson is a 66 y.o. female who presents today for:  Chief Complaint   Patient presents with    Cough     Pt states cough, body aches, headache, sore throat x 9 days. Pt states she has completed a bottle of mucinex day & night. Cough  Associated symptoms include headaches, myalgias, postnasal drip, rhinorrhea, a sore throat and wheezing. Pertinent negatives include no chills, fever, rash or shortness of breath.          Coughing   A lot   Bringing up phelgm, most of it clear   Completed an entire large box of mucinex, not getting better  Sleeping sitting up in a recliner  Once she lays down flat shes coughing her head off   Sometimes wheezing   Asthma as a child   She does not smoke   Sometimes when she gets sick it will flair up and she will need steroids   She is alos having sinus   No covid test at home   DM     Past Medical History:   Diagnosis Date    Breast disorder     LEFT LUMP 1990 BENIGN    Depression     Diabetes mellitus ([address])     Menopausal symptoms     Overactive bladder     Stress incontinence      Past Surgical History:   Procedure Laterality Date    BLADDER SUSPENSION      TBT Mesh removal- 5/2020 Some in rectum    BREAST BIOPSY      BREAST SURGERY      HYSTERECTOMY (CERVIX STATUS UNKNOWN)      DONG/BSO    LEEP       Social History     Socioeconomic History    Marital status:      Spouse name: Not on file    Number of children: Not on file    Years of education: Not on file    Highest education level: Not on file   Occupational History    Not on file   Tobacco Use    Smoking status: Never    Smokeless tobacco: Never   Substance and Sexual Activity    Alcohol use: Yes    Drug use: Never    Sexual activity: Not on file   Other Topics Concern    Not on file   Social History Narrative    Not on file     Social Determinants of Health     Financial Resource Strain: Low Risk  (6/12/2023)    Overall Financial Resource Strain (CARDIA)     Difficulty of Paying

## 2023-09-26 ENCOUNTER — OFFICE VISIT (OUTPATIENT)
Dept: FAMILY MEDICINE CLINIC | Age: 58
End: 2023-09-26
Payer: COMMERCIAL

## 2023-09-26 VITALS
HEART RATE: 79 BPM | BODY MASS INDEX: 24.11 KG/M2 | SYSTOLIC BLOOD PRESSURE: 133 MMHG | DIASTOLIC BLOOD PRESSURE: 77 MMHG | WEIGHT: 150 LBS | HEIGHT: 66 IN

## 2023-09-26 DIAGNOSIS — E78.2 MIXED HYPERLIPIDEMIA: ICD-10-CM

## 2023-09-26 DIAGNOSIS — J01.90 ACUTE BACTERIAL SINUSITIS: ICD-10-CM

## 2023-09-26 DIAGNOSIS — E11.65 TYPE 2 DIABETES MELLITUS WITH HYPERGLYCEMIA, WITHOUT LONG-TERM CURRENT USE OF INSULIN (HCC): Primary | ICD-10-CM

## 2023-09-26 DIAGNOSIS — B96.89 ACUTE BACTERIAL SINUSITIS: ICD-10-CM

## 2023-09-26 DIAGNOSIS — F41.9 ANXIETY: ICD-10-CM

## 2023-09-26 LAB — HBA1C MFR BLD: 8.4 %

## 2023-09-26 PROCEDURE — 83036 HEMOGLOBIN GLYCOSYLATED A1C: CPT | Performed by: NURSE PRACTITIONER

## 2023-09-26 PROCEDURE — 99214 OFFICE O/P EST MOD 30 MIN: CPT | Performed by: NURSE PRACTITIONER

## 2023-09-26 PROCEDURE — 3052F HG A1C>EQUAL 8.0%<EQUAL 9.0%: CPT | Performed by: NURSE PRACTITIONER

## 2023-09-26 RX ORDER — TIRZEPATIDE 5 MG/.5ML
5 INJECTION, SOLUTION SUBCUTANEOUS WEEKLY
Qty: 4 ADJUSTABLE DOSE PRE-FILLED PEN SYRINGE | Refills: 5 | Status: SHIPPED | OUTPATIENT
Start: 2023-09-26

## 2023-09-26 RX ORDER — TRAZODONE HYDROCHLORIDE 50 MG/1
TABLET ORAL
Qty: 180 TABLET | Refills: 3 | Status: SHIPPED | OUTPATIENT
Start: 2023-09-26

## 2023-09-29 ENCOUNTER — TELEPHONE (OUTPATIENT)
Dept: FAMILY MEDICINE CLINIC | Age: 58
End: 2023-09-29

## 2023-09-29 NOTE — TELEPHONE ENCOUNTER
Patient states she had an appt with pcp on 9/26 and was informed to call the office if the medications she was given at the walk-in on 9/22 did not help. She still has a cough, postnasal drip and a sore throat so she is inquiring if her pcp would be willing to prescribe an alternate medication. [facility] on [address] [place] [address] is the preferred pharmacy.

## 2023-10-01 RX ORDER — CEFDINIR 300 MG/1
300 CAPSULE ORAL 2 TIMES DAILY
Qty: 20 CAPSULE | Refills: 0 | Status: SHIPPED | OUTPATIENT
Start: 2023-10-01 | End: 2023-10-11

## 2023-10-01 ASSESSMENT — ENCOUNTER SYMPTOMS
CONSTIPATION: 0
RECTAL PAIN: 0
SHORTNESS OF BREATH: 0
TROUBLE SWALLOWING: 0
VISUAL CHANGE: 0
ALLERGIC/IMMUNOLOGIC NEGATIVE: 1
EYES NEGATIVE: 1
DIARRHEA: 0
COUGH: 1
BLOOD IN STOOL: 0
BLURRED VISION: 0
VOICE CHANGE: 0
HYPERVENTILATION: 0
ABDOMINAL PAIN: 0
COLOR CHANGE: 0
GASTROINTESTINAL NEGATIVE: 1
NAUSEA: 0
ANAL BLEEDING: 0
FEELING OF CHOKING: 0

## 2024-03-11 ENCOUNTER — TELEPHONE (OUTPATIENT)
Dept: FAMILY MEDICINE CLINIC | Age: 59
End: 2024-03-11

## 2024-03-11 DIAGNOSIS — Z12.31 SCREENING MAMMOGRAM FOR BREAST CANCER: Primary | ICD-10-CM

## 2024-03-11 NOTE — TELEPHONE ENCOUNTER
PT called needs mammogram order -  Screening with contract   Please let her know once placed so she can schedule

## 2024-03-14 ENCOUNTER — TELEPHONE (OUTPATIENT)
Dept: FAMILY MEDICINE CLINIC | Age: 59
End: 2024-03-14

## 2024-03-14 DIAGNOSIS — Z20.1 EXPOSURE TO TB: Primary | ICD-10-CM
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
## 2024-03-14 NOTE — TELEPHONE ENCOUNTER
Tell her to come in and get a TB test and then repeat it in 8-10 weeks after exposure (after May10th)  Symptoms to watch for:  Unexplained weight loss  Loss of appetite  Night sweats  Fever  Fatigue  If TB disease is in the lungs (pulmonary), symptoms may include:  Coughing for longer than 3 weeks  Hemoptysis (coughing up blood)  Chest pain

## 2024-03-15 ENCOUNTER — NURSE ONLY (OUTPATIENT)
Dept: FAMILY MEDICINE CLINIC | Age: 59
End: 2024-03-15

## 2024-03-15 DIAGNOSIS — Z23 NEED FOR TUBERCULOSIS VACCINATION: Primary | ICD-10-CM

## 2024-03-15 NOTE — TELEPHONE ENCOUNTER
Patient was calling to ask how she should be tested for TB. Do you want her to have a blood test or a skin test.    Thank you.

## 2024-03-26 NOTE — TELEPHONE ENCOUNTER
The two medications that have been signed for the TB need to be cancelled. These were ordered as a medication and not an immunization.

## 2024-04-15 ENCOUNTER — PATIENT MESSAGE (OUTPATIENT)
Dept: FAMILY MEDICINE CLINIC | Age: 59
End: 2024-04-15

## 2024-04-15 DIAGNOSIS — E11.65 TYPE 2 DIABETES MELLITUS WITH HYPERGLYCEMIA, WITHOUT LONG-TERM CURRENT USE OF INSULIN (HCC): ICD-10-CM

## 2024-04-15 DIAGNOSIS — E11.9 TYPE 2 DIABETES MELLITUS WITHOUT COMPLICATION, WITHOUT LONG-TERM CURRENT USE OF INSULIN (HCC): ICD-10-CM

## 2024-04-15 RX ORDER — TIRZEPATIDE 5 MG/.5ML
5 INJECTION, SOLUTION SUBCUTANEOUS WEEKLY
Qty: 4 ADJUSTABLE DOSE PRE-FILLED PEN SYRINGE | Refills: 5 | Status: SHIPPED | OUTPATIENT
Start: 2024-04-15 | End: 2024-04-18 | Stop reason: SDUPTHER

## 2024-04-15 RX ORDER — TIRZEPATIDE 5 MG/.5ML
5 INJECTION, SOLUTION SUBCUTANEOUS WEEKLY
Qty: 4 ADJUSTABLE DOSE PRE-FILLED PEN SYRINGE | Refills: 5 | Status: SHIPPED | OUTPATIENT
Start: 2024-04-15 | End: 2024-04-15

## 2024-04-15 NOTE — TELEPHONE ENCOUNTER
From: Aretha Christianson  To: Gail Cardoza  Sent: 4/15/2024 11:47 AM EDT  Subject: [facility] - Urgent     Hi Gail,   I called this morning to request that Jardiance 25 mg and Mounjaro 5 mg. be sent to [facility] asap. They need a 3 month order. I went to [facility] and was advised that I must now fill these thru [facility]. Would of been nice if they would of told me last month when I picked up my scripts. I stead of waiting until I am out of meds. Please confirm back. So I can follow up with [facility] as well. Thanks Aretha See you on the 22nd.

## 2024-04-15 NOTE — TELEPHONE ENCOUNTER
MEDICATION REFILL REQUEST     Rx Requested    Requested Prescriptions     Pending Prescriptions Disp Refills    empagliflozin (JARDIANCE) 25 MG tablet 90 tablet 3     Sig: Take 1 tablet by mouth daily    Tirzepatide (MOUNJARO) 5 MG/0.5ML SOPN SC injection 4 Adjustable Dose Pre-filled Pen Syringe 5     Sig: Inject 0.5 mLs into the skin once a week         Patient's Last Office Visit   12/12/2023      Patient's Next Office Visit   Future Appointments   Date Time Provider Department Center   4/22/2024  4:00 PM Gail Cardoza, APRN - CNP [facility]   6/20/2024  8:30 AM Austyn Casper,  [facility]         Other comments   /

## 2024-04-16 ENCOUNTER — TELEPHONE (OUTPATIENT)
Dept: FAMILY MEDICINE CLINIC | Age: 59
End: 2024-04-16

## 2024-04-16 DIAGNOSIS — E11.9 TYPE 2 DIABETES MELLITUS WITHOUT COMPLICATION, WITHOUT LONG-TERM CURRENT USE OF INSULIN (HCC): ICD-10-CM

## 2024-04-16 NOTE — TELEPHONE ENCOUNTER
Prior Authorization History  empagliflozin (JARDIANCE) 25 MG tablet     History  PA Detail   View all authorizations for this medication   Closed   4/15/2024  6:45 PM  Case ID: [id number] Close reason: Prior Authorization not required for patient/medication  Note from payer: [facility] has predicted that this prior auth will not be required.   Payer: [employer] Patient's Payer    [phone]      Waiting for Payer Response   4/15/2024  6:45 PM  Sending user: Gail Cardoza APRN - CNP   Payer: [employer] Patient's Payer    [phone]

## 2024-04-16 NOTE — TELEPHONE ENCOUNTER
Pharmacy requesting medication refill. Please approve or deny this request.    Rx requested:  Requested Prescriptions     Pending Prescriptions Disp Refills    traZODone (DESYREL) 50 MG tablet [Pharmacy Med Name: TRAZODONE HCL TABS 50MG] 180 tablet 0    glipiZIDE (GLUCOTROL) 5 MG tablet [Pharmacy Med Name: GLIPIZIDE TABS 5MG] 180 tablet 0     Sig: Take 1 tablet by mouth in the morning and 1 tablet in the evening.         Last Office Visit:   12/12/2023      Next Visit Date:  Future Appointments   Date Time Provider Department Center   4/22/2024  4:00 PM Gail Cardoza, FRANTZ - CNP [facility]   6/20/2024  8:30 AM Austyn Casper DO [facility]

## 2024-04-18 DIAGNOSIS — E11.65 TYPE 2 DIABETES MELLITUS WITH HYPERGLYCEMIA, WITHOUT LONG-TERM CURRENT USE OF INSULIN (HCC): ICD-10-CM

## 2024-04-19 RX ORDER — GLIPIZIDE 5 MG/1
5 TABLET ORAL 2 TIMES DAILY
Qty: 180 TABLET | Refills: 0 | Status: SHIPPED | OUTPATIENT
Start: 2024-04-19

## 2024-04-19 RX ORDER — TRAZODONE HYDROCHLORIDE 50 MG/1
TABLET ORAL
Qty: 180 TABLET | Refills: 0 | Status: SHIPPED | OUTPATIENT
Start: 2024-04-19

## 2024-04-19 RX ORDER — TIRZEPATIDE 5 MG/.5ML
5 INJECTION, SOLUTION SUBCUTANEOUS WEEKLY
Qty: 12 ADJUSTABLE DOSE PRE-FILLED PEN SYRINGE | Refills: 1 | Status: SHIPPED | OUTPATIENT
Start: 2024-04-19

## 2024-04-20 DIAGNOSIS — E66.9 TYPE 2 DIABETES MELLITUS WITH OBESITY (HCC): ICD-10-CM

## 2024-04-20 DIAGNOSIS — E11.69 TYPE 2 DIABETES MELLITUS WITH OBESITY (HCC): ICD-10-CM

## 2024-04-20 DIAGNOSIS — E78.2 MIXED HYPERLIPIDEMIA: ICD-10-CM

## 2024-04-20 LAB
ALBUMIN SERPL-MCNC: 4.3 G/DL (ref 3.5–4.6)
ALP SERPL-CCNC: 82 U/L (ref 40–130)
ALT SERPL-CCNC: 42 U/L (ref 0–33)
ANION GAP SERPL CALCULATED.3IONS-SCNC: 8 MEQ/L (ref 9–15)
AST SERPL-CCNC: 28 U/L (ref 0–35)
BASOPHILS # BLD: 0 K/UL (ref 0–0.2)
BASOPHILS NFR BLD: 0.3 %
BILIRUB SERPL-MCNC: 0.5 MG/DL (ref 0.2–0.7)
BUN SERPL-MCNC: 7 MG/DL (ref 6–20)
CALCIUM SERPL-MCNC: 9.4 MG/DL (ref 8.5–9.9)
CHLORIDE SERPL-SCNC: 102 MEQ/L (ref 95–107)
CHOLEST SERPL-MCNC: 171 MG/DL (ref 0–199)
CO2 SERPL-SCNC: 29 MEQ/L (ref 20–31)
CREAT SERPL-MCNC: 0.65 MG/DL (ref 0.5–0.9)
CREAT UR-MCNC: 317.9 MG/DL
EOSINOPHIL # BLD: 0.2 K/UL (ref 0–0.7)
EOSINOPHIL NFR BLD: 2.4 %
ERYTHROCYTE [DISTWIDTH] IN BLOOD BY AUTOMATED COUNT: 12.4 % (ref 11.5–14.5)
GLOBULIN SER CALC-MCNC: 2.6 G/DL (ref 2.3–3.5)
GLUCOSE SERPL-MCNC: 101 MG/DL (ref 70–99)
HCT VFR BLD AUTO: 45.8 % (ref 37–47)
HDLC SERPL-MCNC: 33 MG/DL (ref 40–59)
HGB BLD-MCNC: 15.9 G/DL (ref 12–16)
LDLC SERPL CALC-MCNC: 67 MG/DL (ref 0–129)
LYMPHOCYTES # BLD: 2.7 K/UL (ref 1–4.8)
LYMPHOCYTES NFR BLD: 41.6 %
MCH RBC QN AUTO: 29.2 PG (ref 27–31.3)
MCHC RBC AUTO-ENTMCNC: 34.7 % (ref 33–37)
MCV RBC AUTO: 84.2 FL (ref 79.4–94.8)
MICROALBUMIN UR-MCNC: 8.9 MG/DL
MICROALBUMIN/CREAT UR-RTO: 28 MG/G (ref 0–30)
MONOCYTES # BLD: 0.5 K/UL (ref 0.2–0.8)
MONOCYTES NFR BLD: 7.3 %
NEUTROPHILS # BLD: 3.2 K/UL (ref 1.4–6.5)
NEUTS SEG NFR BLD: 47.9 %
PLATELET # BLD AUTO: 290 K/UL (ref 130–400)
POTASSIUM SERPL-SCNC: 4.3 MEQ/L (ref 3.4–4.9)
PROT SERPL-MCNC: 6.9 G/DL (ref 6.3–8)
RBC # BLD AUTO: 5.44 M/UL (ref 4.2–5.4)
SODIUM SERPL-SCNC: 139 MEQ/L (ref 135–144)
TRIGL SERPL-MCNC: 356 MG/DL (ref 0–150)
WBC # BLD AUTO: 6.6 K/UL (ref 4.8–10.8)

## 2024-04-21 LAB
ESTIMATED AVERAGE GLUCOSE: 169 MG/DL
HBA1C MFR BLD: 7.5 % (ref 4–6)

## 2024-04-22 ENCOUNTER — OFFICE VISIT (OUTPATIENT)
Dept: FAMILY MEDICINE CLINIC | Age: 59
End: 2024-04-22
Payer: COMMERCIAL

## 2024-04-22 VITALS
WEIGHT: 148 LBS | DIASTOLIC BLOOD PRESSURE: 72 MMHG | BODY MASS INDEX: 23.78 KG/M2 | HEIGHT: 66 IN | SYSTOLIC BLOOD PRESSURE: 124 MMHG

## 2024-04-22 DIAGNOSIS — F51.01 PRIMARY INSOMNIA: ICD-10-CM

## 2024-04-22 DIAGNOSIS — E11.69 TYPE 2 DIABETES MELLITUS WITH OBESITY (HCC): Primary | ICD-10-CM

## 2024-04-22 DIAGNOSIS — E66.9 TYPE 2 DIABETES MELLITUS WITH OBESITY (HCC): Primary | ICD-10-CM

## 2024-04-22 DIAGNOSIS — E78.2 MIXED HYPERLIPIDEMIA: ICD-10-CM

## 2024-04-22 PROCEDURE — 3051F HG A1C>EQUAL 7.0%<8.0%: CPT | Performed by: NURSE PRACTITIONER

## 2024-04-22 PROCEDURE — 99214 OFFICE O/P EST MOD 30 MIN: CPT | Performed by: NURSE PRACTITIONER

## 2024-04-22 RX ORDER — TRAZODONE HYDROCHLORIDE 50 MG/1
TABLET ORAL
Qty: 180 TABLET | Refills: 3 | Status: SHIPPED | OUTPATIENT
Start: 2024-04-22

## 2024-04-22 RX ORDER — ROSUVASTATIN CALCIUM 5 MG/1
5 TABLET, COATED ORAL DAILY
Qty: 90 TABLET | Refills: 3 | Status: SHIPPED | OUTPATIENT
Start: 2024-04-22

## 2024-04-22 RX ORDER — LISINOPRIL 2.5 MG/1
2.5 TABLET ORAL DAILY
Qty: 90 TABLET | Refills: 3 | Status: SHIPPED | OUTPATIENT
Start: 2024-04-22

## 2024-04-22 ASSESSMENT — PATIENT HEALTH QUESTIONNAIRE - PHQ9
SUM OF ALL RESPONSES TO PHQ QUESTIONS 1-9: 0
2. FEELING DOWN, DEPRESSED OR HOPELESS: NOT AT ALL
SUM OF ALL RESPONSES TO PHQ9 QUESTIONS 1 & 2: 0
1. LITTLE INTEREST OR PLEASURE IN DOING THINGS: NOT AT ALL
SUM OF ALL RESPONSES TO PHQ QUESTIONS 1-9: 0

## 2024-04-29 ASSESSMENT — ENCOUNTER SYMPTOMS
SHORTNESS OF BREATH: 0
NAUSEA: 0
DIARRHEA: 0
COUGH: 1
GASTROINTESTINAL NEGATIVE: 1
HYPERVENTILATION: 0
TROUBLE SWALLOWING: 0
VOICE CHANGE: 0
VISUAL CHANGE: 0
EYES NEGATIVE: 1
BLURRED VISION: 0
ALLERGIC/IMMUNOLOGIC NEGATIVE: 1
ABDOMINAL PAIN: 0
ANAL BLEEDING: 0
CONSTIPATION: 0
BLOOD IN STOOL: 0
COLOR CHANGE: 0
FEELING OF CHOKING: 0
RECTAL PAIN: 0

## 2024-04-30 NOTE — PROGRESS NOTES
Subjective  Aretha Christianson, 58 y.o. female presents today with:  Chief Complaint   Patient presents with   • Diabetes   • Discuss Labs     4/20/24   • Anxiety   • Hyperlipidemia   • Insomnia        Diabetes  She has type 2 diabetes mellitus. Her disease course has been worsening. Pertinent negatives for hypoglycemia include no confusion, dizziness, headaches or nervousness/anxiousness. Pertinent negatives for diabetes include no blurred vision, no chest pain, no fatigue, no foot paresthesias, no foot ulcerations, no polydipsia, no polyphagia, no polyuria, no visual change, no weakness and no weight loss. Symptoms are stable. There are no diabetic complications. Pertinent negatives for diabetic complications include no autonomic neuropathy, CVA, heart disease, impotence, nephropathy, peripheral neuropathy, PVD or retinopathy. Risk factors for coronary artery disease include diabetes mellitus. Current diabetic treatment includes oral agent (monotherapy). She is compliant with treatment some of the time. She is following a generally healthy diet. Meal planning includes avoidance of concentrated sweets. She participates in exercise intermittently. An ACE inhibitor/angiotensin II receptor blocker is being taken. She does not see a podiatrist.Eye exam is current.   Anxiety  Presents for follow-up visit. Symptoms include insomnia. Patient reports no chest pain, compulsions, confusion, decreased concentration, depressed mood, dizziness, dry mouth, excessive worry, feeling of choking, hyperventilation, impotence, irritability, malaise, muscle tension, nausea, nervous/anxious behavior, obsessions, palpitations, panic, restlessness, shortness of breath or suicidal ideas. Symptoms occur occasionally. The severity of symptoms is mild. The quality of sleep is fair.       Hyperlipidemia  Pertinent negatives include no chest pain or shortness of breath.   Insomnia  Associated symptoms include coughing. Pertinent negatives include

## 2024-05-07 NOTE — TELEPHONE ENCOUNTER
There are 2 medications attached to this encounter. Does this still need to be addressed or can they be cancelled?    Thank you.

## 2024-05-30 ENCOUNTER — PATIENT MESSAGE (OUTPATIENT)
Dept: FAMILY MEDICINE CLINIC | Age: 59
End: 2024-05-30

## 2024-06-03 RX ORDER — TRAZODONE HYDROCHLORIDE 100 MG/1
100 TABLET ORAL NIGHTLY
Qty: 90 TABLET | Refills: 1 | Status: SHIPPED | OUTPATIENT
Start: 2024-06-03

## 2024-06-13 RX ORDER — TRAZODONE HYDROCHLORIDE 100 MG/1
TABLET ORAL
Refills: 0 | OUTPATIENT
Start: 2024-06-13

## 2024-06-13 RX ORDER — PAROXETINE HYDROCHLORIDE 20 MG/1
TABLET, FILM COATED ORAL
Refills: 0 | OUTPATIENT
Start: 2024-06-13

## 2024-06-13 RX ORDER — DULAGLUTIDE 1.5 MG/.5ML
INJECTION, SOLUTION SUBCUTANEOUS
Refills: 0 | OUTPATIENT
Start: 2024-06-13

## 2024-06-17 ENCOUNTER — HOSPITAL ENCOUNTER (OUTPATIENT)
Dept: WOMENS IMAGING | Age: 59
Discharge: HOME | End: 2024-06-19
Payer: COMMERCIAL

## 2024-06-17 DIAGNOSIS — Z12.31 SCREENING MAMMOGRAM FOR BREAST CANCER: ICD-10-CM

## 2024-06-17 PROCEDURE — 77063 BREAST TOMOSYNTHESIS BI: CPT

## 2024-06-20 ENCOUNTER — PROCEDURE VISIT (OUTPATIENT)
Dept: OBGYN CLINIC | Age: 59
End: 2024-06-20

## 2024-06-20 VITALS
BODY MASS INDEX: 23.63 KG/M2 | DIASTOLIC BLOOD PRESSURE: 80 MMHG | WEIGHT: 147 LBS | SYSTOLIC BLOOD PRESSURE: 124 MMHG | HEART RATE: 85 BPM | HEIGHT: 66 IN

## 2024-06-20 DIAGNOSIS — N90.3 VULVAR DYSPLASIA: ICD-10-CM

## 2024-06-20 DIAGNOSIS — Z01.419 WOMEN'S ANNUAL ROUTINE GYNECOLOGICAL EXAMINATION: Primary | ICD-10-CM

## 2024-06-20 DIAGNOSIS — Z79.4 TYPE 2 DIABETES MELLITUS WITHOUT COMPLICATION, WITH LONG-TERM CURRENT USE OF INSULIN (HCC): ICD-10-CM

## 2024-06-20 DIAGNOSIS — E11.9 TYPE 2 DIABETES MELLITUS WITHOUT COMPLICATION, WITH LONG-TERM CURRENT USE OF INSULIN (HCC): ICD-10-CM

## 2024-06-20 RX ORDER — CLOBETASOL PROPIONATE 0.5 MG/G
EMULSION TOPICAL DAILY
Qty: 60 G | Refills: 0 | Status: SHIPPED | OUTPATIENT
Start: 2024-06-20

## 2024-06-20 SDOH — ECONOMIC STABILITY: INCOME INSECURITY: HOW HARD IS IT FOR YOU TO PAY FOR THE VERY BASICS LIKE FOOD, HOUSING, MEDICAL CARE, AND HEATING?: NOT HARD AT ALL

## 2024-06-20 SDOH — ECONOMIC STABILITY: FOOD INSECURITY: WITHIN THE PAST 12 MONTHS, YOU WORRIED THAT YOUR FOOD WOULD RUN OUT BEFORE YOU GOT MONEY TO BUY MORE.: NEVER TRUE

## 2024-06-20 SDOH — ECONOMIC STABILITY: FOOD INSECURITY: WITHIN THE PAST 12 MONTHS, THE FOOD YOU BOUGHT JUST DIDN'T LAST AND YOU DIDN'T HAVE MONEY TO GET MORE.: NEVER TRUE

## 2024-06-20 ASSESSMENT — ENCOUNTER SYMPTOMS
BLOOD IN STOOL: 0
ABDOMINAL PAIN: 0
VOMITING: 0
COLOR CHANGE: 0
ABDOMINAL DISTENTION: 0
BACK PAIN: 0
CHEST TIGHTNESS: 0
SORE THROAT: 0
NAUSEA: 0
TROUBLE SWALLOWING: 0
CONSTIPATION: 0
WHEEZING: 0
SHORTNESS OF BREATH: 0
COUGH: 0
VOICE CHANGE: 0

## 2024-06-20 NOTE — PROGRESS NOTES
HPI:  Aretha Christianson (: 1965) is a 59 y.o. female, Established patient, here for evaluation of the following chief complaint(s):  Annual Exam (Labial biopsy as well)  Patient complains of vulvar itching and irritation.  She has had multiple pelvic floor surgeries.  She is unable to have intercourse due to discomfort.      SUBJECTIVE/OBJECTIVE:    Past Surgical History:   Procedure Laterality Date    BLADDER SUSPENSION      TBT Mesh removal- 2020 Some in rectum    BREAST BIOPSY      BREAST SURGERY      HYSTERECTOMY (CERVIX STATUS UNKNOWN)      DONG/BSO    LEEP          Review of Systems   Constitutional:  Negative for activity change, appetite change, fatigue and unexpected weight change.   HENT:  Negative for dental problem, ear pain, hearing loss, nosebleeds, sore throat, trouble swallowing and voice change.    Eyes:  Negative for visual disturbance.   Respiratory:  Negative for cough, chest tightness, shortness of breath and wheezing.    Cardiovascular:  Negative for chest pain and palpitations.   Gastrointestinal:  Negative for abdominal distention, abdominal pain, blood in stool, constipation, nausea and vomiting.   Endocrine: Negative for cold intolerance, heat intolerance, polydipsia, polyphagia and polyuria.   Genitourinary:  Positive for dyspareunia and vaginal pain. Negative for difficulty urinating, dysuria, flank pain, frequency, genital sores, hematuria, menstrual problem, pelvic pain, urgency, vaginal bleeding and vaginal discharge.   Musculoskeletal:  Negative for arthralgias, back pain, joint swelling and myalgias.   Skin:  Negative for color change and rash.   Allergic/Immunologic: Negative for environmental allergies, food allergies and immunocompromised state.   Neurological:  Negative for dizziness, seizures, syncope, speech difficulty, weakness, numbness and headaches.   Hematological:  Negative for adenopathy. Does not bruise/bleed easily.   Psychiatric/Behavioral:  Negative for

## 2024-06-21 DIAGNOSIS — Z91.89 AT RISK FOR BREAST CANCER: Primary | ICD-10-CM

## 2024-06-21 DIAGNOSIS — R92.30 DENSE BREAST TISSUE: ICD-10-CM

## 2024-06-21 DIAGNOSIS — Z80.3 FAMILY HISTORY OF BREAST CANCER IN MOTHER: ICD-10-CM

## 2024-07-02 DIAGNOSIS — E11.9 TYPE 2 DIABETES MELLITUS WITHOUT COMPLICATION, WITHOUT LONG-TERM CURRENT USE OF INSULIN (HCC): ICD-10-CM

## 2024-07-02 RX ORDER — GLIPIZIDE 5 MG/1
TABLET ORAL
Qty: 180 TABLET | Refills: 3 | Status: SHIPPED | OUTPATIENT
Start: 2024-07-02

## 2024-07-22 RX ORDER — ESTRADIOL 1 MG/1
TABLET ORAL
Qty: 90 TABLET | Refills: 3 | Status: SHIPPED | OUTPATIENT
Start: 2024-07-22

## 2024-07-23 ENCOUNTER — OFFICE VISIT (OUTPATIENT)
Dept: OBGYN CLINIC | Age: 59
End: 2024-07-23
Payer: COMMERCIAL

## 2024-07-23 VITALS
DIASTOLIC BLOOD PRESSURE: 82 MMHG | HEIGHT: 66 IN | BODY MASS INDEX: 23.63 KG/M2 | SYSTOLIC BLOOD PRESSURE: 120 MMHG | HEART RATE: 96 BPM | WEIGHT: 147 LBS

## 2024-07-23 DIAGNOSIS — N90.3 VULVAR DYSPLASIA: Primary | ICD-10-CM

## 2024-07-23 PROCEDURE — 99214 OFFICE O/P EST MOD 30 MIN: CPT | Performed by: OBSTETRICS & GYNECOLOGY

## 2024-07-23 RX ORDER — FLUCONAZOLE 150 MG/1
TABLET ORAL
Qty: 33 TABLET | Refills: 0 | Status: SHIPPED | OUTPATIENT
Start: 2024-07-23

## 2024-07-23 RX ORDER — CLOBETASOL PROPIONATE 0.5 MG/G
EMULSION TOPICAL DAILY
Qty: 60 G | Refills: 0 | Status: SHIPPED | OUTPATIENT
Start: 2024-07-23

## 2024-07-23 RX ORDER — AMITRIPTYLINE HYDROCHLORIDE 25 MG/1
25 TABLET, FILM COATED ORAL NIGHTLY
Qty: 30 TABLET | Refills: 3 | Status: SHIPPED | OUTPATIENT
Start: 2024-07-23

## 2024-07-23 NOTE — PROGRESS NOTES
visit.      An electronic signature was used to authenticate this note. Please note this report has been partially produced using speech recognition software and may cause contain errors related to that system including grammar, punctuation and spelling as well as words and phrases that may seem inappropriate. If there are questions or concerns please feel free to contact me to clarify.

## 2024-07-25 ENCOUNTER — OFFICE VISIT (OUTPATIENT)
Dept: ENDOCRINOLOGY | Age: 59
End: 2024-07-25
Payer: COMMERCIAL

## 2024-07-25 VITALS
OXYGEN SATURATION: 97 % | DIASTOLIC BLOOD PRESSURE: 76 MMHG | HEART RATE: 75 BPM | HEIGHT: 66 IN | WEIGHT: 144 LBS | BODY MASS INDEX: 23.14 KG/M2 | SYSTOLIC BLOOD PRESSURE: 121 MMHG

## 2024-07-25 DIAGNOSIS — E11.65 TYPE 2 DIABETES MELLITUS WITH HYPERGLYCEMIA, WITHOUT LONG-TERM CURRENT USE OF INSULIN (HCC): Primary | ICD-10-CM

## 2024-07-25 DIAGNOSIS — E01.0 THYROMEGALY: ICD-10-CM

## 2024-07-25 LAB
CHP ED QC CHECK: NORMAL
GLUCOSE BLD-MCNC: 203 MG/DL
HBA1C MFR BLD: 8.4 %

## 2024-07-25 PROCEDURE — 83036 HEMOGLOBIN GLYCOSYLATED A1C: CPT | Performed by: PHYSICIAN ASSISTANT

## 2024-07-25 PROCEDURE — 82962 GLUCOSE BLOOD TEST: CPT | Performed by: PHYSICIAN ASSISTANT

## 2024-07-25 PROCEDURE — 99214 OFFICE O/P EST MOD 30 MIN: CPT | Performed by: PHYSICIAN ASSISTANT

## 2024-07-25 PROCEDURE — 3052F HG A1C>EQUAL 8.0%<EQUAL 9.0%: CPT | Performed by: PHYSICIAN ASSISTANT

## 2024-07-25 RX ORDER — TIRZEPATIDE 5 MG/.5ML
5 INJECTION, SOLUTION SUBCUTANEOUS WEEKLY
Qty: 12 ADJUSTABLE DOSE PRE-FILLED PEN SYRINGE | Refills: 1 | Status: SHIPPED | OUTPATIENT
Start: 2024-07-25

## 2024-07-25 RX ORDER — ACYCLOVIR 400 MG/1
1 TABLET ORAL
Qty: 12 EACH | Refills: 10 | Status: SHIPPED | OUTPATIENT
Start: 2024-07-25

## 2024-07-25 ASSESSMENT — ENCOUNTER SYMPTOMS
ABDOMINAL PAIN: 0
VISUAL CHANGE: 1
SHORTNESS OF BREATH: 0
DIARRHEA: 0
SORE THROAT: 0
COUGH: 0
SINUS PRESSURE: 0
VOMITING: 0
WHEEZING: 0
RHINORRHEA: 0
NAUSEA: 0

## 2024-07-25 NOTE — PATIENT INSTRUCTIONS
Endocrinology-    Check your blood sugars 4 times a day, before meals and at night  Document these numbers in a blood glucose log and bring them with you to your follow-up appointment.  If you are prescribed insulin, Do not take your mealtime insulin if your blood sugars less than 120   Call our office if you have blood sugars less than 80 or greater then 300 on two or more occasions  Call our office if you have any questions regarding your blood sugars or insulin dosing regiment  Signs of low blood sugar may include tremors, feeling shaky, sweating, dizziness, confusion and weakness. Check your blood sugar immediatly if you have any of these symptoms.     The plan as discussed at your appointment-

## 2024-07-25 NOTE — PROGRESS NOTES
2024    Assessment:       Diagnosis Orders   1. Type 2 diabetes mellitus with hyperglycemia, without long-term current use of insulin ([facility])  Comprehensive Metabolic Panel    Hemoglobin A1C    POCT Glucose    POCT glycosylated hemoglobin (Hb A1C)    Tirzepatide (MOUNJARO) 5 MG/0.5ML SOPN SC injection    C-Peptide      2. Thyromegaly  US HEAD NECK SOFT TISSUE THYROID            PLAN:     Glipizide 5 mg once daily tablet   Restart Mounjaro 5 mg injected weekly   Dexcom G7 ordered  Repeat labs one week before your next appointment  Follow up in 3 months       Orders Placed This Encounter   Procedures    US HEAD NECK SOFT TISSUE THYROID     Standing Status:   Future     Standing Expiration Date:   2025    Comprehensive Metabolic Panel     Standing Status:   Future     Standing Expiration Date:   2025    Hemoglobin A1C     Standing Status:   Future     Standing Expiration Date:   2025    C-Peptide     Standing Status:   Future     Standing Expiration Date:   2025    POCT Glucose    POCT glycosylated hemoglobin (Hb A1C)     Orders Placed This Encounter   Medications    Continuous Glucose Sensor (DEXCOM G7 SENSOR) MISC     Si Device by Does not apply route every 10 days     Dispense:  12 each     Refill:  10    Tirzepatide (MOUNJARO) 5 MG/0.5ML SOPN SC injection     Sig: Inject 0.5 mLs into the skin once a week     Dispense:  12 Adjustable Dose Pre-filled Pen Syringe     Refill:  1     Return in about 3 months (around 10/25/2024) for Diabetes.  Subjective:     Chief Complaint   Patient presents with    Diabetes     Vitals:    24 0916   BP: 121/76   Pulse: 75   SpO2: 97%   Weight: 65.3 kg (144 lb)   Height: 1.676 m (5' 6\")     Wt Readings from Last 3 Encounters:   24 65.3 kg (144 lb)   24 66.7 kg (147 lb)   24 66.7 kg (147 lb)     BP Readings from Last 3 Encounters:   24 121/76   24 120/82   24 124/80     Patient is a 59-year-old type 2 diabetic female

## 2024-07-29 ENCOUNTER — HOSPITAL ENCOUNTER (OUTPATIENT)
Dept: ULTRASOUND IMAGING | Age: 59
Discharge: HOME | End: 2024-07-31
Payer: COMMERCIAL

## 2024-07-29 DIAGNOSIS — E01.0 THYROMEGALY: ICD-10-CM

## 2024-07-29 PROCEDURE — 76536 US EXAM OF HEAD AND NECK: CPT

## 2024-07-30 ENCOUNTER — PATIENT MESSAGE (OUTPATIENT)
Dept: ENDOCRINOLOGY | Age: 59
End: 2024-07-30

## 2024-07-30 DIAGNOSIS — E11.65 TYPE 2 DIABETES MELLITUS WITH HYPERGLYCEMIA, WITHOUT LONG-TERM CURRENT USE OF INSULIN (HCC): ICD-10-CM

## 2024-07-30 RX ORDER — TIRZEPATIDE 5 MG/.5ML
5 INJECTION, SOLUTION SUBCUTANEOUS WEEKLY
Qty: 12 ADJUSTABLE DOSE PRE-FILLED PEN SYRINGE | Refills: 1 | Status: SHIPPED | OUTPATIENT
Start: 2024-07-30

## 2024-07-30 RX ORDER — KETOROLAC TROMETHAMINE 30 MG/ML
INJECTION, SOLUTION INTRAMUSCULAR; INTRAVENOUS
Qty: 1 EACH | Refills: 0 | Status: SHIPPED | OUTPATIENT
Start: 2024-07-30

## 2024-07-30 RX ORDER — BLOOD-GLUCOSE SENSOR
EACH MISCELLANEOUS
Qty: 6 EACH | Refills: 3 | Status: SHIPPED | OUTPATIENT
Start: 2024-07-30

## 2024-07-30 NOTE — TELEPHONE ENCOUNTER
From: Aretha Christianson  To: David Pathak  Sent: 7/30/2024 11:51 AM EDT  Subject: Monjurno    If you can please send a script to [facility] for a 3 month supply. I received a message that they have it back in stock now. Also, I was told that the FreeStyle Andreea 3CGM is covered under my insurance if that monitor will work. Thanks Aretha

## 2024-07-30 NOTE — TELEPHONE ENCOUNTER
From: Aretha Christianson  To: David Pathak  Sent: 7/30/2024 3:53 PM EDT  Subject: Monjurno    I received your message. Yes 5mg. is correct.   Thanks,  Aretha

## 2024-08-21 ENCOUNTER — APPOINTMENT (OUTPATIENT)
Dept: ENDOCRINOLOGY | Facility: HOSPITAL | Age: 59
End: 2024-08-21
Payer: COMMERCIAL

## 2024-08-30 ENCOUNTER — APPOINTMENT (OUTPATIENT)
Dept: ENDOCRINOLOGY | Facility: HOSPITAL | Age: 59
End: 2024-08-30
Payer: COMMERCIAL

## 2024-09-03 ENCOUNTER — APPOINTMENT (OUTPATIENT)
Dept: ENDOCRINOLOGY | Facility: CLINIC | Age: 59
End: 2024-09-03
Payer: COMMERCIAL

## 2024-09-20 RX ORDER — TRAZODONE HYDROCHLORIDE 100 MG/1
100 TABLET ORAL NIGHTLY
Qty: 90 TABLET | Refills: 1 | Status: SHIPPED | OUTPATIENT
Start: 2024-09-20

## 2024-10-01 ENCOUNTER — OFFICE VISIT (OUTPATIENT)
Dept: FAMILY MEDICINE CLINIC | Age: 59
End: 2024-10-01
Payer: COMMERCIAL

## 2024-10-01 VITALS
DIASTOLIC BLOOD PRESSURE: 82 MMHG | SYSTOLIC BLOOD PRESSURE: 124 MMHG | HEIGHT: 66 IN | BODY MASS INDEX: 23.14 KG/M2 | WEIGHT: 144 LBS

## 2024-10-01 DIAGNOSIS — E11.9 TYPE 2 DIABETES MELLITUS WITHOUT COMPLICATION, WITHOUT LONG-TERM CURRENT USE OF INSULIN (HCC): Primary | ICD-10-CM

## 2024-10-01 DIAGNOSIS — E78.2 MIXED HYPERLIPIDEMIA: ICD-10-CM

## 2024-10-01 PROCEDURE — 99214 OFFICE O/P EST MOD 30 MIN: CPT | Performed by: NURSE PRACTITIONER

## 2024-10-01 PROCEDURE — 3052F HG A1C>EQUAL 8.0%<EQUAL 9.0%: CPT | Performed by: NURSE PRACTITIONER

## 2024-10-01 RX ORDER — DEXTROMETHORPHAN HYDROBROMIDE AND PROMETHAZINE HYDROCHLORIDE 15; 6.25 MG/5ML; MG/5ML
5 SYRUP ORAL 4 TIMES DAILY PRN
Qty: 180 ML | Refills: 0 | Status: SHIPPED | OUTPATIENT
Start: 2024-10-01 | End: 2024-10-11

## 2024-10-01 RX ORDER — AZITHROMYCIN 250 MG/1
TABLET, FILM COATED ORAL
Qty: 6 TABLET | Refills: 0 | Status: SHIPPED | OUTPATIENT
Start: 2024-10-01 | End: 2024-10-11

## 2024-10-01 RX ORDER — TRAZODONE HYDROCHLORIDE 100 MG/1
100 TABLET ORAL NIGHTLY
Qty: 90 TABLET | Refills: 1 | Status: SHIPPED | OUTPATIENT
Start: 2024-10-01

## 2024-10-04 DIAGNOSIS — E11.65 TYPE 2 DIABETES MELLITUS WITH HYPERGLYCEMIA, WITHOUT LONG-TERM CURRENT USE OF INSULIN (HCC): ICD-10-CM

## 2024-10-04 RX ORDER — BLOOD-GLUCOSE SENSOR
EACH MISCELLANEOUS
Qty: 6 EACH | Refills: 5 | Status: SHIPPED | OUTPATIENT
Start: 2024-10-04

## 2024-10-08 ASSESSMENT — ENCOUNTER SYMPTOMS
SHORTNESS OF BREATH: 0
EYES NEGATIVE: 1
ABDOMINAL PAIN: 0
RECTAL PAIN: 0
GASTROINTESTINAL NEGATIVE: 1
ALLERGIC/IMMUNOLOGIC NEGATIVE: 1
COLOR CHANGE: 0
CONSTIPATION: 0
DIARRHEA: 0
COUGH: 1
TROUBLE SWALLOWING: 0
FEELING OF CHOKING: 0
HYPERVENTILATION: 0
VOICE CHANGE: 0
BLOOD IN STOOL: 0
ANAL BLEEDING: 0
BLURRED VISION: 0
NAUSEA: 0
VISUAL CHANGE: 0

## 2024-10-09 NOTE — PROGRESS NOTES
sinusitis.    Diagnoses and all orders for this visit:    Type 2 diabetes mellitus without complication, without long-term current use of insulin (HCC)    Mixed hyperlipidemia    Other orders  -     traZODone (DESYREL) 100 MG tablet; Take 1 tablet by mouth nightly  -     promethazine-dextromethorphan (PROMETHAZINE-DM) 6.25-15 MG/5ML syrup; Take 5 mLs by mouth 4 times daily as needed for Cough  -     azithromycin (ZITHROMAX) 250 MG tablet; 500mg on day 1 followed by 250mg on days 2 - 5      No orders of the defined types were placed in this encounter.    Orders Placed This Encounter   Medications   • traZODone (DESYREL) 100 MG tablet     Sig: Take 1 tablet by mouth nightly     Dispense:  90 tablet     Refill:  1     This prescription was filled on 2024. Any refills authorized will be placed on file.   • promethazine-dextromethorphan (PROMETHAZINE-DM) 6.25-15 MG/5ML syrup     Sig: Take 5 mLs by mouth 4 times daily as needed for Cough     Dispense:  180 mL     Refill:  0   • azithromycin (ZITHROMAX) 250 MG tablet     Simg on day 1 followed by 250mg on days 2 - 5     Dispense:  6 tablet     Refill:  0     Medications Discontinued During This Encounter   Medication Reason   • empagliflozin (JARDIANCE) 25 MG tablet LIST CLEANUP   • glipiZIDE (GLUCOTROL) 5 MG tablet LIST CLEANUP   • fluconazole (DIFLUCAN) 150 MG tablet LIST CLEANUP   • Continuous Glucose Sensor (DEXCOM G7 SENSOR) MISC LIST CLEANUP   • clobetasol prop emollient base (CLOBETASOL PROPIONATE E) 0.05 % CREA LIST CLEANUP   • amitriptyline (ELAVIL) 25 MG tablet LIST CLEANUP   • blood glucose test strips (ONETOUCH VERIO) strip LIST CLEANUP   • traZODone (DESYREL) 100 MG tablet REORDER     Return in about 6 months (around 2025).      Reviewed with the patient: current clinical status, medications, activities and diet.     Side effects, adverse effects of the medication prescribed today, as well as treatment plan/ rationale and result expectations

## 2024-10-15 ENCOUNTER — OFFICE VISIT (OUTPATIENT)
Dept: OBGYN CLINIC | Age: 59
End: 2024-10-15
Payer: COMMERCIAL

## 2024-10-15 VITALS
HEIGHT: 66 IN | DIASTOLIC BLOOD PRESSURE: 70 MMHG | SYSTOLIC BLOOD PRESSURE: 110 MMHG | HEART RATE: 82 BPM | WEIGHT: 143 LBS | BODY MASS INDEX: 22.98 KG/M2

## 2024-10-15 DIAGNOSIS — N90.3 VULVAR DYSPLASIA: Primary | ICD-10-CM

## 2024-10-15 PROCEDURE — 99213 OFFICE O/P EST LOW 20 MIN: CPT | Performed by: OBSTETRICS & GYNECOLOGY

## 2024-10-15 ASSESSMENT — ENCOUNTER SYMPTOMS
ABDOMINAL DISTENTION: 0
SORE THROAT: 0
VOICE CHANGE: 0
TROUBLE SWALLOWING: 0
WHEEZING: 0
BLOOD IN STOOL: 0
CHEST TIGHTNESS: 0
ABDOMINAL PAIN: 0
COLOR CHANGE: 0
SHORTNESS OF BREATH: 0
VOMITING: 0
CONSTIPATION: 0
COUGH: 0
NAUSEA: 0
BACK PAIN: 0

## 2024-10-15 NOTE — PROGRESS NOTES
HPI:  Aretha Christianson (: 1965) is a 59 y.o. female, Established patient, here for evaluation of the following chief complaint(s):  Follow-up  Ada has noted a cyst slight improvement in her vulvar symptoms after she had taken the clobetasol.  She was also taken off the majority of her medications for type 2 diabetes and she is only on Mounjaro at this point.  That has helped her vulvar symptoms dramatically.  She is not sexually active.  She had been diagnosed with lichen sclerosis      SUBJECTIVE/OBJECTIVE:    Past Surgical History:   Procedure Laterality Date    BLADDER SUSPENSION      TBT Mesh removal- 2020 Some in rectum    BREAST BIOPSY      BREAST SURGERY      HYSTERECTOMY (CERVIX STATUS UNKNOWN)      DONG/BSO    LEEP          Review of Systems   Constitutional:  Negative for activity change, appetite change, fatigue and unexpected weight change.   HENT:  Negative for dental problem, ear pain, hearing loss, nosebleeds, sore throat, trouble swallowing and voice change.    Eyes:  Negative for visual disturbance.   Respiratory:  Negative for cough, chest tightness, shortness of breath and wheezing.    Cardiovascular:  Negative for chest pain and palpitations.   Gastrointestinal:  Negative for abdominal distention, abdominal pain, blood in stool, constipation, nausea and vomiting.   Endocrine: Negative for cold intolerance, heat intolerance, polydipsia, polyphagia and polyuria.   Genitourinary:  Negative for difficulty urinating, dyspareunia, dysuria, flank pain, frequency, genital sores, hematuria, menstrual problem, pelvic pain, urgency, vaginal bleeding, vaginal discharge and vaginal pain.   Musculoskeletal:  Negative for arthralgias, back pain, joint swelling and myalgias.   Skin:  Negative for color change and rash.   Allergic/Immunologic: Negative for environmental allergies, food allergies and immunocompromised state.   Neurological:  Negative for dizziness, seizures, syncope, speech difficulty,

## 2024-10-16 ENCOUNTER — TELEPHONE (OUTPATIENT)
Dept: OBGYN CLINIC | Age: 59
End: 2024-10-16

## 2024-10-16 RX ORDER — CLOBETASOL PROPIONATE 0.5 MG/G
CREAM TOPICAL
Qty: 60 G | Refills: 2 | Status: SHIPPED | OUTPATIENT
Start: 2024-10-16

## 2024-10-16 RX ORDER — ESTRADIOL 0.1 MG/G
CREAM VAGINAL
Qty: 42.5 G | Refills: 3 | Status: SHIPPED | OUTPATIENT
Start: 2024-10-16

## 2024-10-16 RX ORDER — BLOOD-GLUCOSE SENSOR
EACH MISCELLANEOUS
Qty: 2 EACH | Refills: 0 | Status: SHIPPED | OUTPATIENT
Start: 2024-10-16

## 2024-10-21 RX ORDER — BLOOD-GLUCOSE SENSOR
EACH MISCELLANEOUS
Qty: 2 EACH | Refills: 3 | Status: SHIPPED | OUTPATIENT
Start: 2024-10-21

## 2024-10-31 ENCOUNTER — OFFICE VISIT (OUTPATIENT)
Dept: ENDOCRINOLOGY | Age: 59
End: 2024-10-31

## 2024-10-31 VITALS
HEIGHT: 66 IN | DIASTOLIC BLOOD PRESSURE: 81 MMHG | WEIGHT: 140 LBS | HEART RATE: 86 BPM | SYSTOLIC BLOOD PRESSURE: 120 MMHG | BODY MASS INDEX: 22.5 KG/M2 | OXYGEN SATURATION: 96 %

## 2024-10-31 DIAGNOSIS — E11.65 TYPE 2 DIABETES MELLITUS WITH HYPERGLYCEMIA, WITHOUT LONG-TERM CURRENT USE OF INSULIN (HCC): Primary | ICD-10-CM

## 2024-10-31 PROBLEM — E66.9 TYPE 2 DIABETES MELLITUS WITH OBESITY (HCC): Status: RESOLVED | Noted: 2018-08-01 | Resolved: 2024-10-31

## 2024-10-31 PROBLEM — Z83.3 FAMILY HISTORY OF DIABETES MELLITUS (DM): Status: RESOLVED | Noted: 2021-04-07 | Resolved: 2024-10-31

## 2024-10-31 PROBLEM — E11.69 TYPE 2 DIABETES MELLITUS WITH OBESITY (HCC): Status: RESOLVED | Noted: 2018-08-01 | Resolved: 2024-10-31

## 2024-10-31 LAB
CHP ED QC CHECK: NORMAL
GLUCOSE BLD-MCNC: 121 MG/DL
HBA1C MFR BLD: 5.9 %

## 2024-10-31 ASSESSMENT — ENCOUNTER SYMPTOMS
DIARRHEA: 0
WHEEZING: 0
SHORTNESS OF BREATH: 0
COUGH: 0
VISUAL CHANGE: 1
SINUS PRESSURE: 0
VOMITING: 0
RHINORRHEA: 0
NAUSEA: 0
ABDOMINAL PAIN: 0
SORE THROAT: 0

## 2024-10-31 NOTE — PATIENT INSTRUCTIONS
Continue Mounjaro 5 mg injected weekly   Freestyle Andreea 3 being used   Repeat labs one week before your next appointment  Follow up in 4 months

## 2024-10-31 NOTE — PROGRESS NOTES
Disp: 90 tablet, Rfl: 1  •  Tirzepatide (MOUNJARO) 5 MG/0.5ML SOPN SC injection, Inject 0.5 mLs into the skin once a week, Disp: 12 Adjustable Dose Pre-filled Pen Syringe, Rfl: 1  •  Continuous Glucose  (FREESTYLE LAMONT 3 READER) MARIANO, Glucose , Disp: 1 each, Rfl: 0  •  estradiol (ESTRACE) 1 MG tablet, TAKE 1 TABLET DAILY, Disp: 90 tablet, Rfl: 3  •  lisinopril (ZESTRIL) 2.5 MG tablet, Take 1 tablet by mouth daily, Disp: 90 tablet, Rfl: 3  •  rosuvastatin (CRESTOR) 5 MG tablet, Take 1 tablet by mouth daily, Disp: 90 tablet, Rfl: 3  •  gabapentin (NEURONTIN) 300 MG capsule, Take 1 capsule by mouth 3 times daily for 90 days., Disp: 270 capsule, Rfl: 0  •  OneTouch Delica Lancets 30G MISC, Checks 1 time daily. NIDDM--ICD-10 E11.9, Disp: 100 each, Rfl: 3  Lab Results   Component Value Date     04/20/2024    K 4.3 04/20/2024     04/20/2024    CO2 29 04/20/2024    BUN 7 04/20/2024    CREATININE 0.65 04/20/2024    GLUCOSE 101 (H) 04/20/2024    CALCIUM 9.4 04/20/2024    BILITOT 0.5 04/20/2024    ALKPHOS 82 04/20/2024    AST 28 04/20/2024    ALT 42 (H) 04/20/2024    LABGLOM >90.0 04/20/2024    GFRAA >60.0 07/03/2021    GLOB 2.6 04/20/2024     Lab Results   Component Value Date    WBC 6.6 04/20/2024    HGB 15.9 04/20/2024    HCT 45.8 04/20/2024    MCV 84.2 04/20/2024     04/20/2024     Lab Results   Component Value Date    LABA1C 5.9 10/31/2024    LABA1C 8.4 07/25/2024    LABA1C 7.5 (H) 04/20/2024      Latest Reference Range & Units 04/20/24 07:36   Creatinine, Ur Not Established mg/dL 317.9   Microalb/Creat Ratio POC 0.0 - 30.0 mg/G 28.0   Microalb, Ur Not Established mg/dL 8.90 (H)   (H): Data is abnormally high  Lab Results   Component Value Date    HDL 33 (L) 04/20/2024    HDL 34 (L) 01/04/2023    HDL 30 (L) 12/05/2022    CHOL 171 04/20/2024    CHOL 123 01/04/2023    CHOL 242 (H) 12/05/2022    TRIG 356 (H) 04/20/2024    TRIG 168 (H) 01/04/2023    TRIG 791 (H) 12/05/2022     No results

## 2024-11-18 ENCOUNTER — TELEPHONE (OUTPATIENT)
Dept: ENDOCRINOLOGY | Age: 59
End: 2024-11-18

## 2024-11-18 RX ORDER — TIRZEPATIDE 5 MG/.5ML
INJECTION, SOLUTION SUBCUTANEOUS
Qty: 2 ML | Refills: 5 | Status: SHIPPED | OUTPATIENT
Start: 2024-11-18

## 2024-11-18 NOTE — TELEPHONE ENCOUNTER
PATIENT NEEDS MORE MOUNJARO, WOULD LIKE RX SENT TO [facility] IN THIS BUILDING. PLEASE REFILL

## 2024-11-18 NOTE — PROGRESS NOTES
A prescription for Mounjaro 5 mg injected weekly was sent to [facility] [place] today.  5 refills were ordered

## 2024-11-20 ENCOUNTER — TELEPHONE (OUTPATIENT)
Dept: ENDOCRINOLOGY | Age: 59
End: 2024-11-20

## 2024-11-20 NOTE — TELEPHONE ENCOUNTER
Prior authorization has been started on Cover My Meds for Mounjaro 5 mg clinical uploaded, authorization pending Key: [id number]  [place] Case ID #: [id number]  Approved today by [facility] 2017  CaseId:[id number];Status:Approved;Review Type:Prior Auth;Coverage Start Date:10/21/2024;Coverage End Date:11/20/2025;  Authorization Expiration Date: 11/19/2025

## 2024-11-22 RX ORDER — ESTRADIOL 0.1 MG/G
CREAM VAGINAL
Qty: 42.5 G | Refills: 2
Start: 2024-11-22

## 2024-12-10 ENCOUNTER — TELEPHONE (OUTPATIENT)
Dept: FAMILY MEDICINE CLINIC | Age: 59
End: 2024-12-10

## 2024-12-10 NOTE — TELEPHONE ENCOUNTER
----- Message from Boris HAYS sent at 12/10/2024  9:20 AM EST -----  Regarding: ECC Escalation To Practice  ECC Escalation To Practice      Type of Escalation: Acute Care Symptom  --------------------------------------------------------------------------------------------------------------------------    Information for Provider:  Patient is looking for appointment for: Symptom anxiety  Reasons for Message: Unable to reach practice     Additional Information Patient is experiencing an anxiety for a month now and want to see her provider Gail Padgett APRN - [facility] for this condition.   --------------------------------------------------------------------------------------------------------------------------    Relationship to Patient: Self     Call Back Info: OK to leave message on myGreek  Preferred Call Back Number: Phone [phone]

## 2024-12-12 ENCOUNTER — OFFICE VISIT (OUTPATIENT)
Dept: FAMILY MEDICINE CLINIC | Age: 59
End: 2024-12-12

## 2024-12-12 VITALS
BODY MASS INDEX: 23.14 KG/M2 | WEIGHT: 144 LBS | SYSTOLIC BLOOD PRESSURE: 126 MMHG | DIASTOLIC BLOOD PRESSURE: 80 MMHG | HEIGHT: 66 IN | HEART RATE: 82 BPM

## 2024-12-12 DIAGNOSIS — F41.8 SITUATIONAL ANXIETY: Primary | ICD-10-CM

## 2024-12-12 RX ORDER — VILAZODONE HYDROCHLORIDE 20 MG/1
20 TABLET ORAL DAILY
Qty: 30 TABLET | Refills: 5 | Status: SHIPPED | OUTPATIENT
Start: 2024-12-12

## 2024-12-12 RX ORDER — CLONAZEPAM 0.5 MG/1
.25-.5 TABLET ORAL 2 TIMES DAILY PRN
Qty: 40 TABLET | Refills: 2 | Status: SHIPPED | OUTPATIENT
Start: 2024-12-12 | End: 2025-01-11

## 2024-12-12 ASSESSMENT — ENCOUNTER SYMPTOMS
GASTROINTESTINAL NEGATIVE: 1
EYES NEGATIVE: 1
ABDOMINAL PAIN: 0
ANAL BLEEDING: 0
DIARRHEA: 0
RESPIRATORY NEGATIVE: 1
BLOOD IN STOOL: 0
VOICE CHANGE: 0
RECTAL PAIN: 0
TROUBLE SWALLOWING: 0
ALLERGIC/IMMUNOLOGIC NEGATIVE: 1
SHORTNESS OF BREATH: 0
CONSTIPATION: 0
COLOR CHANGE: 0

## 2024-12-12 NOTE — PROGRESS NOTES
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          Subjective   History of Present Illness  The patient is a 59-year-old female who presents for evaluation of anxiety.    She reports persistent anxiety, which she attributes to the cumulative stressors in her life, including caring for her mother, managing household responsibilities, and maintaining her full-time employment. Despite not experiencing depression, she finds it challenging to relax even during periods of rest. Her thoughts are often preoccupied with her to-do list, scheduling conflicts, and her mother's treatment regimen. She has sought palliative care for her mother, who is cognitively impaired and requires constant supervision. She has requested an FMLA letter from her employer to accommodate her need for additional time off. She also reports irritability, particularly when fatigued. Her sleep is generally good, aided by trazodone, and she uses the Calm enzo to help her fall asleep. She has previously taken BuSpar for anxiety but did not find it beneficial. She is seeking a medication that will provide immediate relief without causing daytime fatigue or sleepiness.    Supplemental Information  She experiences chronic constipation.    FAMILY HISTORY  Her mother has dementia and is in the late stages. Her sister, who is 73 years old, also has dementia.    MEDICATIONS  Current: trazodone  Past: BuSpar    Review of Systems   Constitutional: Negative.  Negative for activity change, appetite change, fatigue and unexpected

## 2025-01-27 RX ORDER — TIRZEPATIDE 5 MG/.5ML
INJECTION, SOLUTION SUBCUTANEOUS
Qty: 6 ML | Refills: 3 | Status: SHIPPED | OUTPATIENT
Start: 2025-01-27

## 2025-02-06 ENCOUNTER — OFFICE VISIT (OUTPATIENT)
Age: 60
End: 2025-02-06
Payer: COMMERCIAL

## 2025-02-06 VITALS
SYSTOLIC BLOOD PRESSURE: 130 MMHG | BODY MASS INDEX: 23.63 KG/M2 | WEIGHT: 147 LBS | DIASTOLIC BLOOD PRESSURE: 72 MMHG | HEIGHT: 66 IN

## 2025-02-06 DIAGNOSIS — U09.9 POST-COVID SYNDROME: ICD-10-CM

## 2025-02-06 DIAGNOSIS — B96.89 ACUTE BACTERIAL SINUSITIS: ICD-10-CM

## 2025-02-06 DIAGNOSIS — E11.65 TYPE 2 DIABETES MELLITUS WITH HYPERGLYCEMIA, WITHOUT LONG-TERM CURRENT USE OF INSULIN (HCC): Primary | ICD-10-CM

## 2025-02-06 DIAGNOSIS — J01.90 ACUTE BACTERIAL SINUSITIS: ICD-10-CM

## 2025-02-06 DIAGNOSIS — F41.8 SITUATIONAL ANXIETY: ICD-10-CM

## 2025-02-06 PROCEDURE — 99214 OFFICE O/P EST MOD 30 MIN: CPT | Performed by: NURSE PRACTITIONER

## 2025-02-06 RX ORDER — LEVOCETIRIZINE DIHYDROCHLORIDE 5 MG/1
5 TABLET, FILM COATED ORAL NIGHTLY
Qty: 30 TABLET | Refills: 1 | Status: SHIPPED | OUTPATIENT
Start: 2025-02-06

## 2025-02-06 RX ORDER — DOXYCYCLINE HYCLATE 100 MG
100 TABLET ORAL 2 TIMES DAILY
Qty: 28 TABLET | Refills: 0 | Status: SHIPPED | OUTPATIENT
Start: 2025-02-06 | End: 2025-02-20

## 2025-02-06 RX ORDER — CLONAZEPAM 0.5 MG/1
.25-.5 TABLET ORAL 2 TIMES DAILY PRN
Qty: 100 TABLET | Refills: 0 | Status: SHIPPED | OUTPATIENT
Start: 2025-02-06 | End: 2025-05-07

## 2025-02-06 RX ORDER — VILAZODONE HYDROCHLORIDE 20 MG/1
20 TABLET ORAL DAILY
Qty: 90 TABLET | Refills: 2 | Status: SHIPPED | OUTPATIENT
Start: 2025-02-06

## 2025-02-06 RX ORDER — DEXTROMETHORPHAN HYDROBROMIDE AND PROMETHAZINE HYDROCHLORIDE 15; 6.25 MG/5ML; MG/5ML
5 SYRUP ORAL 4 TIMES DAILY PRN
Qty: 240 ML | Refills: 0 | Status: SHIPPED | OUTPATIENT
Start: 2025-02-06

## 2025-02-06 SDOH — ECONOMIC STABILITY: FOOD INSECURITY: WITHIN THE PAST 12 MONTHS, YOU WORRIED THAT YOUR FOOD WOULD RUN OUT BEFORE YOU GOT MONEY TO BUY MORE.: NEVER TRUE

## 2025-02-06 SDOH — ECONOMIC STABILITY: FOOD INSECURITY: WITHIN THE PAST 12 MONTHS, THE FOOD YOU BOUGHT JUST DIDN'T LAST AND YOU DIDN'T HAVE MONEY TO GET MORE.: NEVER TRUE

## 2025-02-06 ASSESSMENT — PATIENT HEALTH QUESTIONNAIRE - PHQ9
SUM OF ALL RESPONSES TO PHQ QUESTIONS 1-9: 0
1. LITTLE INTEREST OR PLEASURE IN DOING THINGS: NOT AT ALL
SUM OF ALL RESPONSES TO PHQ QUESTIONS 1-9: 0
SUM OF ALL RESPONSES TO PHQ QUESTIONS 1-9: 0
2. FEELING DOWN, DEPRESSED OR HOPELESS: NOT AT ALL
SUM OF ALL RESPONSES TO PHQ QUESTIONS 1-9: 0
SUM OF ALL RESPONSES TO PHQ9 QUESTIONS 1 & 2: 0

## 2025-02-10 ENCOUNTER — OFFICE VISIT (OUTPATIENT)
Dept: URGENT CARE | Age: 60
End: 2025-02-10
Payer: COMMERCIAL

## 2025-02-10 VITALS
TEMPERATURE: 98.1 F | BODY MASS INDEX: 22.5 KG/M2 | HEART RATE: 86 BPM | OXYGEN SATURATION: 98 % | WEIGHT: 140 LBS | DIASTOLIC BLOOD PRESSURE: 86 MMHG | RESPIRATION RATE: 14 BRPM | SYSTOLIC BLOOD PRESSURE: 132 MMHG | HEIGHT: 66 IN

## 2025-02-10 DIAGNOSIS — J02.9 SORE THROAT: ICD-10-CM

## 2025-02-10 DIAGNOSIS — R59.0 CERVICAL LYMPHADENOPATHY: ICD-10-CM

## 2025-02-10 DIAGNOSIS — J03.90 TONSILLITIS: Primary | ICD-10-CM

## 2025-02-10 LAB — POC RAPID STREP: NEGATIVE

## 2025-02-10 RX ORDER — ESTRADIOL 1 MG/1
1 TABLET ORAL DAILY
COMMUNITY
Start: 2024-07-22

## 2025-02-10 RX ORDER — ESTRADIOL 0.1 MG/G
CREAM VAGINAL
COMMUNITY
Start: 2024-11-22

## 2025-02-10 RX ORDER — VILAZODONE HYDROCHLORIDE 20 MG/1
1 TABLET ORAL DAILY
COMMUNITY
Start: 2025-02-06

## 2025-02-10 RX ORDER — DOXYCYCLINE HYCLATE 100 MG
1 TABLET ORAL 2 TIMES DAILY
COMMUNITY
Start: 2025-02-06 | End: 2025-02-10 | Stop reason: SINTOL

## 2025-02-10 RX ORDER — PROMETHAZINE HYDROCHLORIDE AND DEXTROMETHORPHAN HYDROBROMIDE 6.25; 15 MG/5ML; MG/5ML
5 SYRUP ORAL
COMMUNITY
Start: 2025-02-06

## 2025-02-10 RX ORDER — ROSUVASTATIN CALCIUM 5 MG/1
5 TABLET, COATED ORAL
COMMUNITY

## 2025-02-10 RX ORDER — AMOXICILLIN AND CLAVULANATE POTASSIUM 875; 125 MG/1; MG/1
1 TABLET, FILM COATED ORAL 2 TIMES DAILY
Qty: 20 TABLET | Refills: 0 | Status: SHIPPED | OUTPATIENT
Start: 2025-02-10 | End: 2025-02-20

## 2025-02-10 RX ORDER — DEXAMETHASONE 6 MG/1
6 TABLET ORAL DAILY
Qty: 5 TABLET | Refills: 0 | Status: SHIPPED | OUTPATIENT
Start: 2025-02-10 | End: 2025-02-15

## 2025-02-10 RX ORDER — BLOOD-GLUCOSE SENSOR
EACH MISCELLANEOUS
COMMUNITY
Start: 2025-02-04

## 2025-02-10 RX ORDER — LISINOPRIL 2.5 MG/1
1 TABLET ORAL DAILY
COMMUNITY
Start: 2024-04-22

## 2025-02-10 RX ORDER — TRAZODONE HYDROCHLORIDE 100 MG/1
1 TABLET ORAL NIGHTLY
COMMUNITY
Start: 2024-10-01

## 2025-02-10 RX ORDER — TIRZEPATIDE 5 MG/.5ML
INJECTION, SOLUTION SUBCUTANEOUS
COMMUNITY
Start: 2025-01-27

## 2025-02-10 RX ORDER — GABAPENTIN 300 MG/1
300 CAPSULE ORAL
COMMUNITY
Start: 2023-12-12

## 2025-02-10 RX ORDER — CLONAZEPAM 0.5 MG/1
.25-.5 TABLET ORAL
COMMUNITY
Start: 2025-02-06 | End: 2025-05-07

## 2025-02-10 RX ORDER — LEVOCETIRIZINE DIHYDROCHLORIDE 5 MG/1
1 TABLET, FILM COATED ORAL NIGHTLY
COMMUNITY
Start: 2025-02-06

## 2025-02-10 ASSESSMENT — ENCOUNTER SYMPTOMS
RESPIRATORY NEGATIVE: 1
GASTROINTESTINAL NEGATIVE: 1
HEMATOLOGIC/LYMPHATIC NEGATIVE: 1
MUSCULOSKELETAL NEGATIVE: 1
PALPITATIONS: 0
NEUROLOGICAL NEGATIVE: 1
EYES NEGATIVE: 1
CONSTITUTIONAL NEGATIVE: 1
ALLERGIC/IMMUNOLOGIC NEGATIVE: 1
PSYCHIATRIC NEGATIVE: 1
ENDOCRINE NEGATIVE: 1
SINUS PRESSURE: 1
SINUS PAIN: 1
SORE THROAT: 1

## 2025-02-10 NOTE — LETTER
February 10, 2025     Patient: Evelyn Cedeno   YOB: 1965   Date of Visit: 2/10/2025       To Whom It May Concern:    It is my medical opinion that Evelyn Cedeno may return to work on 2/12/25 .    If you have any questions or concerns, please don't hesitate to call.         Sincerely,        RONDA Patel-CNP    CC: No Recipients

## 2025-02-10 NOTE — PROGRESS NOTES
"Subjective   Patient ID: Evelyn Cedeno is a 59 y.o. female. They present today with a chief complaint of Sore Throat (X 4 days complains of lefty sided sore throat, swollen glands, radiating pain in left ear, post nasal drip, excessive mucus, pain/difficulty swallowing. Was seen on 02/06/2024 by Julissa Bronson, NP was given Phenergan, Doxy and Xyzal with no relief.).    History of Present Illness  HPI    Pt presents to urgent care with c/o  sore throat,Painful swallowing, left ear pain, postnasal drainage, sinus pain/pressure has been ongoing for the past 1 week.  Reports has been taking prescription doxycycline from PCP with no relief .  Pt denies CP, SOB, palpitations, fevers, abd pain, n/v/d, sick contacts, recent travel.        Past Medical History  Allergies as of 02/10/2025    (No Known Allergies)       (Not in a hospital admission)       No past medical history on file.    No past surgical history on file.     reports that she has never smoked. She has never been exposed to tobacco smoke. She has never used smokeless tobacco.    Review of Systems  Review of Systems   Constitutional: Negative.    HENT:  Positive for congestion, ear pain, postnasal drip, sinus pressure, sinus pain and sore throat.    Eyes: Negative.    Respiratory: Negative.     Cardiovascular:  Negative for chest pain and palpitations.   Gastrointestinal: Negative.    Endocrine: Negative.    Genitourinary: Negative.    Musculoskeletal: Negative.    Skin: Negative.    Allergic/Immunologic: Negative.    Neurological: Negative.    Hematological: Negative.    Psychiatric/Behavioral: Negative.     All other systems reviewed and are negative.                                 Objective    Vitals:    02/10/25 0832   BP: 132/86   BP Location: Right arm   Patient Position: Sitting   BP Cuff Size: Small adult   Pulse: 86   Resp: 14   Temp: 36.7 °C (98.1 °F)   TempSrc: Temporal   SpO2: 98%   Weight: 63.5 kg (140 lb)   Height: 1.676 m (5' 6\")     No LMP " recorded.    Physical Exam  Vitals and nursing note reviewed.   Constitutional:       General: She is not in acute distress.     Appearance: Normal appearance. She is not ill-appearing or toxic-appearing.   HENT:      Head: Atraumatic.      Right Ear: Tympanic membrane, ear canal and external ear normal.      Left Ear: Tympanic membrane, ear canal and external ear normal.      Nose: Congestion present.      Mouth/Throat:      Mouth: Mucous membranes are moist.      Pharynx: Oropharynx is clear. No oropharyngeal exudate or posterior oropharyngeal erythema.   Eyes:      Extraocular Movements: Extraocular movements intact.      Conjunctiva/sclera: Conjunctivae normal.      Pupils: Pupils are equal, round, and reactive to light.   Cardiovascular:      Rate and Rhythm: Normal rate and regular rhythm.      Pulses: Normal pulses.      Heart sounds: Normal heart sounds.   Pulmonary:      Effort: Pulmonary effort is normal.      Breath sounds: Normal breath sounds.   Abdominal:      General: Abdomen is flat. Bowel sounds are normal.      Palpations: Abdomen is soft.      Tenderness: There is no abdominal tenderness.   Musculoskeletal:         General: Normal range of motion.      Cervical back: Normal range of motion and neck supple. No tenderness.   Skin:     General: Skin is warm and dry.      Capillary Refill: Capillary refill takes less than 2 seconds.   Neurological:      General: No focal deficit present.      Mental Status: She is alert and oriented to person, place, and time.   Psychiatric:         Mood and Affect: Mood normal.         Behavior: Behavior normal.         Thought Content: Thought content normal.         Procedures    Point of Care Test & Imaging Results from this visit  Results for orders placed or performed in visit on 02/10/25   POCT rapid strep A manually resulted   Result Value Ref Range    POC Rapid Strep Negative Negative      No results found.    Diagnostic study results (if any) were reviewed  by OVIDIO Patel.    Assessment/Plan   Allergies, medications, history, and pertinent labs/EKGs/Imaging reviewed by OVIDIO Patel.     Medical Decision Making  Rapid strep negative.   On exam patient with posterior oropharynx erythema and bilateral tonsillar swelling, left greater than right.  Airway is patent. patient is without drooling, stridor, trismus.  Pt with cervical lymphadenopathy . Given longevity of symptoms, no relief with prescription Doxy, will switch antibiotic to Augmentin for strep and sinusitis coverage.  Will also add Decadron. At time of discharge patient was clinically well-appearing and HDS for outpatient management. The patient was educated regarding diagnosis, supportive care, OTC and Rx medications. The patient was given the opportunity to ask questions prior to discharge.  They verbalized understanding of my discussion of the plans for treatment, expected course, indications to return to  or seek further evaluation in ED, and the need for timely follow up as directed.   They were provided with a work/school excuse if requested.       Orders and Diagnoses  Diagnoses and all orders for this visit:  Tonsillitis  -     amoxicillin-pot clavulanate (Augmentin) 875-125 mg tablet; Take 1 tablet by mouth 2 times a day for 10 days.  Sore throat  -     POCT rapid strep A manually resulted  -     amoxicillin-pot clavulanate (Augmentin) 875-125 mg tablet; Take 1 tablet by mouth 2 times a day for 10 days.  Cervical lymphadenopathy  -     amoxicillin-pot clavulanate (Augmentin) 875-125 mg tablet; Take 1 tablet by mouth 2 times a day for 10 days.  -     dexAMETHasone (Decadron) 6 mg tablet; Take 1 tablet (6 mg) by mouth once daily for 5 days.      Medical Admin Record      Patient disposition: Home    Electronically signed by OVIDIO Patel  8:53 AM

## 2025-02-13 ENCOUNTER — HOSPITAL ENCOUNTER (OUTPATIENT)
Dept: PULMONOLOGY | Age: 60
Discharge: HOME | End: 2025-02-13
Payer: COMMERCIAL

## 2025-02-13 DIAGNOSIS — U09.9 POST-COVID SYNDROME: ICD-10-CM

## 2025-02-13 PROCEDURE — 6360000002 HC RX W HCPCS

## 2025-02-13 PROCEDURE — 94060 EVALUATION OF WHEEZING: CPT

## 2025-02-13 PROCEDURE — 94729 DIFFUSING CAPACITY: CPT

## 2025-02-13 PROCEDURE — 94726 PLETHYSMOGRAPHY LUNG VOLUMES: CPT

## 2025-02-13 RX ORDER — ALBUTEROL SULFATE 0.83 MG/ML
SOLUTION RESPIRATORY (INHALATION)
Status: COMPLETED
Start: 2025-02-13 | End: 2025-02-13

## 2025-02-13 RX ADMIN — ALBUTEROL SULFATE 2.5 MG: 2.5 SOLUTION RESPIRATORY (INHALATION) at 07:49

## 2025-02-17 RX ORDER — LOSARTAN POTASSIUM 25 MG/1
25 TABLET ORAL DAILY
Qty: 90 TABLET | Refills: 3 | Status: SHIPPED | OUTPATIENT
Start: 2025-02-17

## 2025-02-17 ASSESSMENT — ENCOUNTER SYMPTOMS
SHORTNESS OF BREATH: 1
COUGH: 1
RHINORRHEA: 1
WHEEZING: 0
SORE THROAT: 0
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
## 2025-03-04 ENCOUNTER — OFFICE VISIT (OUTPATIENT)
Dept: OBGYN CLINIC | Age: 60
End: 2025-03-04
Payer: COMMERCIAL

## 2025-03-04 VITALS
DIASTOLIC BLOOD PRESSURE: 70 MMHG | BODY MASS INDEX: 22.98 KG/M2 | SYSTOLIC BLOOD PRESSURE: 114 MMHG | HEART RATE: 88 BPM | HEIGHT: 66 IN | WEIGHT: 143 LBS

## 2025-03-04 DIAGNOSIS — N94.9 GENITAL ATROPHY OF FEMALE: ICD-10-CM

## 2025-03-04 DIAGNOSIS — L90.0 LICHEN SCLEROSUS: ICD-10-CM

## 2025-03-04 DIAGNOSIS — N90.3 VULVAR DYSPLASIA: Primary | ICD-10-CM

## 2025-03-04 PROCEDURE — 99213 OFFICE O/P EST LOW 20 MIN: CPT | Performed by: OBSTETRICS & GYNECOLOGY

## 2025-03-04 ASSESSMENT — ENCOUNTER SYMPTOMS
SORE THROAT: 0
TROUBLE SWALLOWING: 0
BACK PAIN: 0
SHORTNESS OF BREATH: 0
VOICE CHANGE: 0
NAUSEA: 0
ABDOMINAL PAIN: 0
VOMITING: 0
ABDOMINAL DISTENTION: 0
COUGH: 0
CHEST TIGHTNESS: 0
WHEEZING: 0
BLOOD IN STOOL: 0
CONSTIPATION: 0
COLOR CHANGE: 0

## 2025-03-04 NOTE — PROGRESS NOTES
HPI:  Aretha Christianson (: 1965) is a 59 y.o. female, Established patient, here for evaluation of the following chief complaint(s):  Follow-up (Here today for follow up)  Patient is doing much better since she has stopped using the diabetic medications.  The vulvar  irritation has resolved.  The patient is not sexually active and is not using the clobetasol or the Estrace cream as previously prescribed      SUBJECTIVE/OBJECTIVE:    Past Surgical History:   Procedure Laterality Date    BLADDER SUSPENSION      TBT Mesh removal- 2020 Some in rectum    BREAST BIOPSY      BREAST SURGERY      HYSTERECTOMY (CERVIX STATUS UNKNOWN)      DONG/BSO    LEEP          Review of Systems   Constitutional:  Negative for activity change, appetite change, fatigue and unexpected weight change.   HENT:  Negative for dental problem, ear pain, hearing loss, nosebleeds, sore throat, trouble swallowing and voice change.    Eyes:  Negative for visual disturbance.   Respiratory:  Negative for cough, chest tightness, shortness of breath and wheezing.    Cardiovascular:  Negative for chest pain and palpitations.   Gastrointestinal:  Negative for abdominal distention, abdominal pain, blood in stool, constipation, nausea and vomiting.   Endocrine: Negative for cold intolerance, heat intolerance, polydipsia, polyphagia and polyuria.   Genitourinary:  Negative for difficulty urinating, dyspareunia, dysuria, flank pain, frequency, genital sores, hematuria, menstrual problem, pelvic pain, urgency, vaginal bleeding, vaginal discharge and vaginal pain.   Musculoskeletal:  Negative for arthralgias, back pain, joint swelling and myalgias.   Skin:  Negative for color change and rash.   Allergic/Immunologic: Negative for environmental allergies, food allergies and immunocompromised state.   Neurological:  Negative for dizziness, seizures, syncope, speech difficulty, weakness, numbness and headaches.   Hematological:  Negative for adenopathy. Does

## 2025-03-07 RX ORDER — TRAZODONE HYDROCHLORIDE 100 MG/1
100 TABLET ORAL NIGHTLY
Qty: 90 TABLET | Refills: 1 | Status: SHIPPED | OUTPATIENT
Start: 2025-03-07

## 2025-03-13 ENCOUNTER — OFFICE VISIT (OUTPATIENT)
Dept: ENDOCRINOLOGY | Age: 60
End: 2025-03-13
Payer: COMMERCIAL

## 2025-03-13 VITALS
BODY MASS INDEX: 22.98 KG/M2 | DIASTOLIC BLOOD PRESSURE: 77 MMHG | SYSTOLIC BLOOD PRESSURE: 117 MMHG | HEART RATE: 77 BPM | OXYGEN SATURATION: 98 % | WEIGHT: 143 LBS | HEIGHT: 66 IN

## 2025-03-13 DIAGNOSIS — E11.65 TYPE 2 DIABETES MELLITUS WITH HYPERGLYCEMIA, WITHOUT LONG-TERM CURRENT USE OF INSULIN (HCC): Primary | ICD-10-CM

## 2025-03-13 LAB
CHP ED QC CHECK: NORMAL
GLUCOSE BLD-MCNC: 161 MG/DL

## 2025-03-13 PROCEDURE — 83036 HEMOGLOBIN GLYCOSYLATED A1C: CPT | Performed by: PHYSICIAN ASSISTANT

## 2025-03-13 PROCEDURE — 95251 CONT GLUC MNTR ANALYSIS I&R: CPT | Performed by: PHYSICIAN ASSISTANT

## 2025-03-13 PROCEDURE — 82962 GLUCOSE BLOOD TEST: CPT | Performed by: PHYSICIAN ASSISTANT

## 2025-03-13 PROCEDURE — 99214 OFFICE O/P EST MOD 30 MIN: CPT | Performed by: PHYSICIAN ASSISTANT

## 2025-03-13 ASSESSMENT — ENCOUNTER SYMPTOMS
SINUS PRESSURE: 0
COUGH: 0
RHINORRHEA: 0
VOMITING: 0
ABDOMINAL PAIN: 0
SHORTNESS OF BREATH: 0
NAUSEA: 0
VISUAL CHANGE: 1
WHEEZING: 0
SORE THROAT: 0
DIARRHEA: 0

## 2025-03-13 NOTE — PROGRESS NOTES
123 01/04/2023    CHOL 242 (H) 12/05/2022    TRIG 356 (H) 04/20/2024    TRIG 168 (H) 01/04/2023    TRIG 791 (H) 12/05/2022     No results found for: \"TESTOSTERONE\", \"SHBG\", \"TESTFREENM\"  No results found for: \"TSH\", \"FT3\", \"T4FREE\"  No results found for: \"TPOABS\"    EXAMINATION:  THYROID ULTRASOUND     7/29/2024     COMPARISON:  None.     HISTORY:  ORDERING SYSTEM PROVIDED HISTORY: Thyromegaly  TECHNOLOGIST PROVIDED HISTORY:  What reading provider will be dictating this exam?->CRC     FINDINGS:  Right thyroid lobe: 4.1 x 1.7 x 1.4cm     Left thyroid lobe: 4.2 x 1.6 x 1.3cm     Isthmus: 0.4cm     Echotexture: Normal     Vascularity: Normal     Thyroid Nodules: Peripherally calcified 4 mm nodule right lobe     Soft tissues: No visualized lymphadenopathy     IMPRESSION:  Benign calcified nodule right lobe.       Review of Systems   Constitutional:  Negative for chills, fatigue and fever.   HENT:  Negative for congestion, ear pain, postnasal drip, rhinorrhea, sinus pressure and sore throat.    Eyes:  Positive for visual disturbance.   Respiratory:  Negative for cough, shortness of breath and wheezing.    Cardiovascular:  Negative for chest pain, palpitations and leg swelling.   Gastrointestinal:  Negative for abdominal pain, diarrhea, nausea and vomiting.   Endocrine: Positive for polyuria. Negative for cold intolerance, heat intolerance and polydipsia.        Patient denies history of pancreatitis, alcohol abuse, sister had thyroid cancer, no chemotherapy or radiation     Genitourinary:  Negative for difficulty urinating.   Musculoskeletal:  Negative for arthralgias.   Skin:  Negative for rash.   Allergic/Immunologic: Negative for environmental allergies.   Neurological:  Positive for tremors. Negative for dizziness and headaches.   Hematological:  Does not bruise/bleed easily.   Psychiatric/Behavioral:  Negative for dysphoric mood.        Objective:   Physical Exam  Vitals reviewed.   Constitutional:       Appearance:

## 2025-03-19 ENCOUNTER — OFFICE VISIT (OUTPATIENT)
Age: 60
End: 2025-03-19
Payer: COMMERCIAL

## 2025-03-19 VITALS
DIASTOLIC BLOOD PRESSURE: 72 MMHG | OXYGEN SATURATION: 97 % | BODY MASS INDEX: 23.18 KG/M2 | SYSTOLIC BLOOD PRESSURE: 120 MMHG | WEIGHT: 144.2 LBS | HEIGHT: 66 IN | HEART RATE: 83 BPM | TEMPERATURE: 97.9 F

## 2025-03-19 DIAGNOSIS — J30.9 CHRONIC ALLERGIC RHINITIS: ICD-10-CM

## 2025-03-19 DIAGNOSIS — J32.9 CHRONIC CONGESTION OF PARANASAL SINUS: ICD-10-CM

## 2025-03-19 DIAGNOSIS — J31.0 CHRONIC RHINITIS: ICD-10-CM

## 2025-03-19 DIAGNOSIS — J02.0 RECURRENT STREPTOCOCCAL PHARYNGITIS: Primary | ICD-10-CM

## 2025-03-19 PROCEDURE — 99214 OFFICE O/P EST MOD 30 MIN: CPT | Performed by: FAMILY MEDICINE

## 2025-03-19 RX ORDER — IPRATROPIUM BROMIDE 42 UG/1
2 SPRAY, METERED NASAL 4 TIMES DAILY
Qty: 15 ML | Refills: 3 | Status: SHIPPED | OUTPATIENT
Start: 2025-03-19

## 2025-03-19 SDOH — HEALTH STABILITY: PHYSICAL HEALTH: ON AVERAGE, HOW MANY MINUTES DO YOU ENGAGE IN EXERCISE AT THIS LEVEL?: 0 MIN

## 2025-03-19 NOTE — PROGRESS NOTES
(NEURONTIN) 300 MG capsule Take 1 capsule by mouth 3 times daily for 90 days. 12/12/23 2/6/25  Gail Cardoza, FRANTZ - CNP              Objective   Blood pressure 120/72, pulse 83, temperature 97.9 °F (36.6 °C), temperature source Temporal, height 1.676 m (5' 6\"), weight 65.4 kg (144 lb 3.2 oz), SpO2 97%, not currently breastfeeding.  Physical Exam  Vitals reviewed.   Constitutional:       Appearance: Normal appearance. She is normal weight.   HENT:      Head: Normocephalic and atraumatic.      Right Ear: Tympanic membrane normal.      Left Ear: Tympanic membrane normal.      Nose: Nose normal.      Mouth/Throat:      Mouth: Mucous membranes are dry.   Eyes:      Extraocular Movements: Extraocular movements intact.      Conjunctiva/sclera: Conjunctivae normal.      Pupils: Pupils are equal, round, and reactive to light.   Cardiovascular:      Rate and Rhythm: Normal rate and regular rhythm.      Pulses: Normal pulses.      Heart sounds: Normal heart sounds.   Pulmonary:      Effort: Pulmonary effort is normal.      Breath sounds: Normal breath sounds.   Abdominal:      General: Abdomen is flat. Bowel sounds are normal.      Palpations: Abdomen is soft.   Musculoskeletal:         General: Normal range of motion.      Cervical back: Normal range of motion and neck supple.   Skin:     General: Skin is warm and dry.   Neurological:      General: No focal deficit present.      Mental Status: She is alert.   Psychiatric:         Mood and Affect: Mood normal.         Behavior: Behavior normal.         Thought Content: Thought content normal.         Judgment: Judgment normal.          Results  Laboratory Studies  A1c is 7.7. Triglycerides are high, cholesterol is not too bad.    Testing  Pulmonary testing came back fine.       Assessment & Plan  1. Chronic cough and congestion.  There is concern given her recurrent infections as well as the chronic congestion and drainage.  There is some exposure issues either at home

## 2025-03-25 DIAGNOSIS — E78.2 MIXED HYPERLIPIDEMIA: ICD-10-CM

## 2025-03-25 RX ORDER — ROSUVASTATIN CALCIUM 5 MG/1
5 TABLET, COATED ORAL DAILY
Qty: 90 TABLET | Refills: 3 | Status: SHIPPED | OUTPATIENT
Start: 2025-03-25

## 2025-05-05 RX ORDER — HYDROCHLOROTHIAZIDE 12.5 MG/1
CAPSULE ORAL
Qty: 2 EACH | Refills: 3 | Status: SHIPPED | OUTPATIENT
Start: 2025-05-05

## 2025-05-09 ENCOUNTER — APPOINTMENT (OUTPATIENT)
Dept: OTOLARYNGOLOGY | Facility: CLINIC | Age: 60
End: 2025-05-09
Payer: COMMERCIAL

## 2025-05-09 DIAGNOSIS — J31.0 CHRONIC RHINITIS: ICD-10-CM

## 2025-05-09 DIAGNOSIS — J32.4 CHRONIC PANSINUSITIS: Primary | ICD-10-CM

## 2025-05-09 DIAGNOSIS — R09.82 POSTNASAL DRIP: ICD-10-CM

## 2025-05-09 PROCEDURE — 99203 OFFICE O/P NEW LOW 30 MIN: CPT | Performed by: OTOLARYNGOLOGY

## 2025-05-09 NOTE — PROGRESS NOTES
Subjective   Patient ID: Evelyn Cedeno is a 59 y.o. female who presents for No chief complaint on file.    HPI  New patient being seen for chronic rhinitis, recurrent strep infections. For past several years, has gotten about 5-6 strep infections per year, along with recurrent bronchitis. Always has runny nose, postnasal drip, mucus in throat which she is unable to cough up. During one recent bout of strep, patient took oral steroid which actually caused increased tonsillar and neck swelling. Has tried multiple antihistamines, decongestants, nasal sprays without relief.     All remaining head neck inquiry otherwise negative.     Review of Systems   Constitutional: Negative.    HENT: Negative.     Respiratory: Negative.     Cardiovascular: Negative.    Neurological: Negative.      Physical Exam  General appearance: No acute distress. Normal facies. Symmetric facial movement. No gross lesions of the face are noted.  Ears:  The external ear structures appear normal. The ear canals patent and the tympanic membranes are intact without evidence of air-fluid levels, retraction, or congenital defects.    Nose:  Anterior rhinoscopy notes essentially a midline nasal septum. Examination is noted for normal healthy mucosal membranes without any evidence of lesions, polyps, or exudate.   Throat/Oral mucosa:  The tongue is normally mobile. There are no lesions on the gingiva, buccal, or oral mucosa. There are no oral cavity masses.  Neck:  The neck is negative for mass lymphadenopathy. The trachea and parotid are clear. The thyroid bed is grossly unremarkable. The salivary gland structures are grossly unremarkable.    Assessment/Plan   Chronic pansinusitis  Chronic rhinitis  Postnasal drip    At this time I would recommend twice daily nasal steroid spray. I will also order Immune Bloodwork and then discuss those results with patient.

## 2025-05-13 DIAGNOSIS — J32.4 CHRONIC PANSINUSITIS: Primary | ICD-10-CM

## 2025-05-13 DIAGNOSIS — J31.0 CHRONIC RHINITIS: ICD-10-CM

## 2025-05-13 LAB
C DIPHTHERIAE AB SER IA-ACNC: 0.34 IU/ML
C TETANI TOXOID AB SER-ACNC: 1.84 IU/ML
IGA SERPL-MCNC: 264 MG/DL (ref 47–310)
IGG SERPL-MCNC: 867 MG/DL (ref 600–1640)
IGG1 SER-MCNC: 440 MG/DL (ref 382–929)
IGG2 SER-MCNC: 303 MG/DL (ref 241–700)
IGG3 SER-MCNC: 37 MG/DL (ref 22–178)
IGG4 SER-MCNC: 39.8 MG/DL (ref 4–86)
IGM SERPL-MCNC: 106 MG/DL (ref 50–300)
S PN DA SERO 19F IGG SER-MCNC: 4.7 UG/ML
S PNEUM DA 1 IGG SER-MCNC: 4.9 UG/ML
S PNEUM DA 10A IGG SER-MCNC: 2.2 UG/ML
S PNEUM DA 11A IGG SER-MCNC: 1.9 UG/ML
S PNEUM DA 12F IGG SER-MCNC: 11.4 UG/ML
S PNEUM DA 14 IGG SER-MCNC: 5.4
S PNEUM DA 15B IGG SER-MCNC: 2.7 UG/ML
S PNEUM DA 17F IGG SER-MCNC: 3.4 UG/ML
S PNEUM DA 18C IGG SER-MCNC: 2.2
S PNEUM DA 19A IGG SER-MCNC: >141 UG/ML
S PNEUM DA 2 IGG SER-MCNC: 20.5 UG/ML
S PNEUM DA 20A IGG SER-MCNC: 4.3 UG/ML
S PNEUM DA 22F IGG SER-MCNC: 0.8 UG/ML
S PNEUM DA 23F IGG SER-MCNC: 0.7 UG/ML
S PNEUM DA 3 IGG SER-MCNC: 0.6 UG/ML
S PNEUM DA 33F IGG SER-MCNC: 4.1 UG/ML
S PNEUM DA 4 IGG SER-MCNC: 2 UG/ML
S PNEUM DA 5 IGG SER-MCNC: 2.9 UG/ML
S PNEUM DA 6B IGG SER-MCNC: 2.5 UG/ML
S PNEUM DA 7F IGG SER-MCNC: 4.9 UG/ML
S PNEUM DA 8 IGG SER-MCNC: 43.5 UG/ML
S PNEUM DA 9N IGG SER-MCNC: 0.5 UG/ML
S PNEUM DA 9V IGG SER-MCNC: 7.2 UG/ML

## 2025-05-13 RX ORDER — DOXYCYCLINE 100 MG/1
100 CAPSULE ORAL 2 TIMES DAILY
Qty: 28 CAPSULE | Refills: 0 | Status: SHIPPED | OUTPATIENT
Start: 2025-05-13 | End: 2025-05-27

## 2025-05-17 DIAGNOSIS — E11.65 TYPE 2 DIABETES MELLITUS WITH HYPERGLYCEMIA, WITHOUT LONG-TERM CURRENT USE OF INSULIN (HCC): ICD-10-CM

## 2025-05-17 LAB
CHOLEST SERPL-MCNC: 178 MG/DL (ref 0–199)
CREAT UR-MCNC: 137 MG/DL
ERYTHROCYTE [DISTWIDTH] IN BLOOD BY AUTOMATED COUNT: 11.7 % (ref 11.5–14.5)
HCT VFR BLD AUTO: 41.8 % (ref 37–47)
HDLC SERPL-MCNC: 37 MG/DL (ref 40–59)
HGB BLD-MCNC: 14.2 G/DL (ref 12–16)
LDLC SERPL CALC-MCNC: 102 MG/DL (ref 0–129)
MCH RBC QN AUTO: 28.6 PG (ref 27–31.3)
MCHC RBC AUTO-ENTMCNC: 34 % (ref 33–37)
MCV RBC AUTO: 84.1 FL (ref 79.4–94.8)
MICROALBUMIN UR-MCNC: <1.2 MG/DL
MICROALBUMIN/CREAT UR-RTO: NORMAL MG/G (ref 0–30)
PLATELET # BLD AUTO: 278 K/UL (ref 130–400)
RBC # BLD AUTO: 4.97 M/UL (ref 4.2–5.4)
TRIGL SERPL-MCNC: 194 MG/DL (ref 0–150)
WBC # BLD AUTO: 6.5 K/UL (ref 4.8–10.8)

## 2025-05-18 LAB
ESTIMATED AVERAGE GLUCOSE: 126 MG/DL
HBA1C MFR BLD: 6 % (ref 4–6)

## 2025-06-10 ENCOUNTER — OFFICE VISIT (OUTPATIENT)
Dept: OBGYN CLINIC | Age: 60
End: 2025-06-10
Payer: COMMERCIAL

## 2025-06-10 VITALS
HEIGHT: 66 IN | HEART RATE: 98 BPM | DIASTOLIC BLOOD PRESSURE: 74 MMHG | SYSTOLIC BLOOD PRESSURE: 118 MMHG | WEIGHT: 141 LBS | BODY MASS INDEX: 22.66 KG/M2

## 2025-06-10 DIAGNOSIS — Z11.51 SPECIAL SCREENING EXAMINATION FOR HUMAN PAPILLOMAVIRUS (HPV): ICD-10-CM

## 2025-06-10 DIAGNOSIS — Z01.419 WOMEN'S ANNUAL ROUTINE GYNECOLOGICAL EXAMINATION: Primary | ICD-10-CM

## 2025-06-10 DIAGNOSIS — Z78.0 MENOPAUSE: ICD-10-CM

## 2025-06-10 DIAGNOSIS — Z12.31 SCREENING MAMMOGRAM FOR BREAST CANCER: ICD-10-CM

## 2025-06-10 PROCEDURE — 99396 PREV VISIT EST AGE 40-64: CPT | Performed by: OBSTETRICS & GYNECOLOGY

## 2025-06-10 ASSESSMENT — ENCOUNTER SYMPTOMS
VOICE CHANGE: 0
VOMITING: 0
BLOOD IN STOOL: 0
SORE THROAT: 0
TROUBLE SWALLOWING: 0
CONSTIPATION: 0
SHORTNESS OF BREATH: 0
CHEST TIGHTNESS: 0
ABDOMINAL DISTENTION: 0
NAUSEA: 0
ABDOMINAL PAIN: 0
WHEEZING: 0
BACK PAIN: 0
COLOR CHANGE: 0
COUGH: 0

## 2025-06-10 NOTE — PROGRESS NOTES
SKELETON          PLAN: Mammogram.  Bone density.  Continue using the estrogen.  Patient to follow-up in 1 year.  Pap smear obtained from the vaginal cuff  Orders Placed This Encounter   Procedures    DEXA BONE DENSITY AXIAL SKELETON     Standing Status:   Future     Expected Date:   12/10/2025     Expiration Date:   6/10/2026    Pap Smear     Patient History:    No LMP recorded. Patient has had a hysterectomy.  OBGYN Status: Hysterectomy  Past Surgical History:  No date: BLADDER SUSPENSION      Comment:  TBT Mesh removal- 5/2020 Some in rectum  No date: BREAST BIOPSY  No date: BREAST SURGERY  No date: HYSTERECTOMY (CERVIX STATUS UNKNOWN)      Comment:  DONG/BSO  No date: LEEP  Medications/Contraceptives Affecting Cytology     Estrogens Disp Start End     estradiol (ESTRACE) 1 MG tablet 90 tablet 7/22/2024 --    Sig: TAKE 1 TABLET DAILY    Cosign for Ordering: Accepted by Austyn Casper DO on 7/22/2024  9:51   AM        Social History    Tobacco Use      Smoking status: Never      Smokeless tobacco: Never       Standing Status:   Future     Number of Occurrences:   1     Expected Date:   6/10/2025     Expiration Date:   6/10/2026     Collection Type:   Thin Prep     Prior Abnormal Pap Test:   No     Screening or Diagnostic:   Screening     HPV Requested?:   Yes     High Risk Patient:   N/A     No orders of the defined types were placed in this encounter.      Repeat Annual every 1 year. New ASCCP guidelines regarding pap and Hi Risk HPV co-testing reviewed.  Cervical Cytology Evaluation begins at 21 years old.  If Negative Cytology, Follow-upscreening per current  ASCCP guidelines.   Mammograms every 1 year. If 41 yo and last mammogram was negative.  Calcium and Vitamin D dosing reviewed.  Colonoscopy screening guidelines reviewed as well as onset forbone density testing.  Birth control and barrier methods and recommendations discussed.  STD counseling and prevention reviewed.  Gardisil counseling completed for

## 2025-06-16 ENCOUNTER — PATIENT MESSAGE (OUTPATIENT)
Age: 60
End: 2025-06-16

## 2025-06-17 RX ORDER — TRAZODONE HYDROCHLORIDE 100 MG/1
100 TABLET ORAL NIGHTLY
Qty: 90 TABLET | Refills: 1 | Status: SHIPPED | OUTPATIENT
Start: 2025-06-17

## 2025-07-07 ENCOUNTER — OFFICE VISIT (OUTPATIENT)
Age: 60
End: 2025-07-07
Payer: COMMERCIAL

## 2025-07-07 VITALS
HEIGHT: 66 IN | BODY MASS INDEX: 23.3 KG/M2 | HEART RATE: 84 BPM | SYSTOLIC BLOOD PRESSURE: 115 MMHG | WEIGHT: 145 LBS | DIASTOLIC BLOOD PRESSURE: 79 MMHG

## 2025-07-07 DIAGNOSIS — E11.65 TYPE 2 DIABETES MELLITUS WITH HYPERGLYCEMIA, WITHOUT LONG-TERM CURRENT USE OF INSULIN (HCC): Primary | ICD-10-CM

## 2025-07-07 PROCEDURE — 3044F HG A1C LEVEL LT 7.0%: CPT | Performed by: PHYSICIAN ASSISTANT

## 2025-07-07 PROCEDURE — 99214 OFFICE O/P EST MOD 30 MIN: CPT | Performed by: PHYSICIAN ASSISTANT

## 2025-07-07 RX ORDER — HYDROCHLOROTHIAZIDE 12.5 MG/1
CAPSULE ORAL
Qty: 2 EACH | Refills: 3 | Status: SHIPPED | OUTPATIENT
Start: 2025-07-07

## 2025-07-07 ASSESSMENT — ENCOUNTER SYMPTOMS
VISUAL CHANGE: 0
ABDOMINAL PAIN: 0
SORE THROAT: 0
SHORTNESS OF BREATH: 0
WHEEZING: 0
RHINORRHEA: 0
VOMITING: 0
SINUS PRESSURE: 0
NAUSEA: 0
COUGH: 0
DIARRHEA: 0

## 2025-07-16 RX ORDER — ESTRADIOL 1 MG/1
1 TABLET ORAL DAILY
Qty: 90 TABLET | Refills: 3 | Status: SHIPPED | OUTPATIENT
Start: 2025-07-16

## 2025-08-06 ENCOUNTER — TELEPHONE (OUTPATIENT)
Dept: OBGYN CLINIC | Age: 60
End: 2025-08-06

## 2025-08-06 DIAGNOSIS — Z12.31 SCREENING MAMMOGRAM FOR BREAST CANCER: Primary | ICD-10-CM

## 2025-08-13 ENCOUNTER — OFFICE VISIT (OUTPATIENT)
Age: 60
End: 2025-08-13
Payer: COMMERCIAL

## 2025-08-13 VITALS
BODY MASS INDEX: 23.24 KG/M2 | TEMPERATURE: 97.8 F | WEIGHT: 144.6 LBS | HEART RATE: 96 BPM | SYSTOLIC BLOOD PRESSURE: 122 MMHG | HEIGHT: 66 IN | OXYGEN SATURATION: 97 % | DIASTOLIC BLOOD PRESSURE: 74 MMHG

## 2025-08-13 DIAGNOSIS — J02.0 RECURRENT STREPTOCOCCAL PHARYNGITIS: ICD-10-CM

## 2025-08-13 DIAGNOSIS — J31.0 CHRONIC RHINITIS: ICD-10-CM

## 2025-08-13 DIAGNOSIS — M25.50 DIFFUSE ARTHRALGIA: Primary | ICD-10-CM

## 2025-08-13 DIAGNOSIS — B96.89 ACUTE BACTERIAL SINUSITIS: ICD-10-CM

## 2025-08-13 DIAGNOSIS — J01.90 ACUTE BACTERIAL SINUSITIS: ICD-10-CM

## 2025-08-13 PROCEDURE — 99214 OFFICE O/P EST MOD 30 MIN: CPT | Performed by: FAMILY MEDICINE

## 2025-08-13 RX ORDER — BROMPHENIRAMINE MALEATE, PSEUDOEPHEDRINE HYDROCHLORIDE, AND DEXTROMETHORPHAN HYDROBROMIDE 2; 30; 10 MG/5ML; MG/5ML; MG/5ML
5 SYRUP ORAL 4 TIMES DAILY PRN
Qty: 118 ML | Refills: 0 | Status: SHIPPED | OUTPATIENT
Start: 2025-08-13

## 2025-08-13 RX ORDER — DOXYCYCLINE HYCLATE 100 MG
100 TABLET ORAL 2 TIMES DAILY
Qty: 20 TABLET | Refills: 0 | Status: SHIPPED | OUTPATIENT
Start: 2025-08-13 | End: 2025-08-23

## 2025-08-13 RX ORDER — METHYLPREDNISOLONE 4 MG/1
TABLET ORAL
Qty: 1 KIT | Refills: 0 | Status: SHIPPED | OUTPATIENT
Start: 2025-08-13

## 2025-08-13 ASSESSMENT — LIFESTYLE VARIABLES
HOW OFTEN DO YOU HAVE A DRINK CONTAINING ALCOHOL: NEVER
HOW MANY STANDARD DRINKS CONTAINING ALCOHOL DO YOU HAVE ON A TYPICAL DAY: PATIENT DOES NOT DRINK

## 2025-08-22 ENCOUNTER — PATIENT MESSAGE (OUTPATIENT)
Age: 60
End: 2025-08-22

## 2025-08-25 ENCOUNTER — HOSPITAL ENCOUNTER (OUTPATIENT)
Dept: WOMENS IMAGING | Age: 60
Discharge: HOME | End: 2025-08-27
Payer: COMMERCIAL

## 2025-08-25 DIAGNOSIS — Z12.31 SCREENING MAMMOGRAM FOR BREAST CANCER: ICD-10-CM

## 2025-08-25 DIAGNOSIS — Z12.31 ENCOUNTER FOR SCREENING MAMMOGRAM FOR MALIGNANT NEOPLASM OF BREAST: ICD-10-CM

## 2025-08-25 PROCEDURE — 77067 SCR MAMMO BI INCL CAD: CPT

## 2025-08-25 PROCEDURE — 77063 BREAST TOMOSYNTHESIS BI: CPT

## 2025-08-25 RX ORDER — MOMETASONE FUROATE MONOHYDRATE 50 UG/1
2 SPRAY, METERED NASAL DAILY
Qty: 17 G | Refills: 0 | Status: SHIPPED | OUTPATIENT
Start: 2025-08-25 | End: 2025-09-08

## 2025-08-25 RX ORDER — CETIRIZINE HYDROCHLORIDE, PSEUDOEPHEDRINE HYDROCHLORIDE 5; 120 MG/1; MG/1
1 TABLET, FILM COATED, EXTENDED RELEASE ORAL 2 TIMES DAILY
Qty: 28 TABLET | Refills: 0 | Status: SHIPPED | OUTPATIENT
Start: 2025-08-25 | End: 2025-09-08

## 2025-08-27 DIAGNOSIS — E11.65 TYPE 2 DIABETES MELLITUS WITH HYPERGLYCEMIA, WITHOUT LONG-TERM CURRENT USE OF INSULIN (HCC): ICD-10-CM

## 2025-08-27 RX ORDER — ACYCLOVIR 800 MG/1
TABLET ORAL
Qty: 6 EACH | Refills: 5 | Status: SHIPPED | OUTPATIENT
Start: 2025-08-27